# Patient Record
Sex: MALE | Race: WHITE | NOT HISPANIC OR LATINO | Employment: FULL TIME | URBAN - METROPOLITAN AREA
[De-identification: names, ages, dates, MRNs, and addresses within clinical notes are randomized per-mention and may not be internally consistent; named-entity substitution may affect disease eponyms.]

---

## 2020-09-01 ENCOUNTER — HOSPITAL ENCOUNTER (OUTPATIENT)
Dept: LAB | Facility: HOSPITAL | Age: 60
Discharge: HOME | End: 2020-09-01
Attending: NURSE PRACTITIONER

## 2020-09-01 ENCOUNTER — OFFICE VISIT CONVERTED (OUTPATIENT)
Dept: FAMILY MEDICINE CLINIC | Facility: CLINIC | Age: 60
End: 2020-09-01
Attending: NURSE PRACTITIONER

## 2020-09-01 ENCOUNTER — CONVERSION ENCOUNTER (OUTPATIENT)
Dept: FAMILY MEDICINE CLINIC | Facility: CLINIC | Age: 60
End: 2020-09-01

## 2020-09-01 LAB
ALBUMIN SERPL-MCNC: 4.4 G/DL (ref 3.5–5)
ALBUMIN/GLOB SERPL: 2 {RATIO} (ref 1.4–2.6)
ALP SERPL-CCNC: 72 U/L (ref 56–119)
ALT SERPL-CCNC: 18 U/L (ref 10–40)
ANION GAP SERPL CALC-SCNC: 20 MMOL/L (ref 8–19)
AST SERPL-CCNC: 20 U/L (ref 15–50)
BASOPHILS # BLD AUTO: 0.03 10*3/UL (ref 0–0.2)
BASOPHILS NFR BLD AUTO: 0.7 % (ref 0–3)
BILIRUB SERPL-MCNC: 0.4 MG/DL (ref 0.2–1.3)
BUN SERPL-MCNC: 24 MG/DL (ref 5–25)
BUN/CREAT SERPL: 24 {RATIO} (ref 6–20)
CALCIUM SERPL-MCNC: 9.9 MG/DL (ref 8.7–10.4)
CHLORIDE SERPL-SCNC: 108 MMOL/L (ref 99–111)
CHOLEST SERPL-MCNC: 175 MG/DL (ref 107–200)
CHOLEST/HDLC SERPL: 4.2 {RATIO} (ref 3–6)
CONV ABS IMM GRAN: 0.01 10*3/UL (ref 0–0.2)
CONV CO2: 20 MMOL/L (ref 22–32)
CONV IMMATURE GRAN: 0.2 % (ref 0–1.8)
CONV TOTAL PROTEIN: 6.6 G/DL (ref 6.3–8.2)
CREAT UR-MCNC: 1.02 MG/DL (ref 0.7–1.2)
DEPRECATED RDW RBC AUTO: 41.1 FL (ref 35.1–43.9)
EOSINOPHIL # BLD AUTO: 0.04 10*3/UL (ref 0–0.7)
EOSINOPHIL # BLD AUTO: 0.9 % (ref 0–7)
ERYTHROCYTE [DISTWIDTH] IN BLOOD BY AUTOMATED COUNT: 11.9 % (ref 11.6–14.4)
GFR SERPLBLD BASED ON 1.73 SQ M-ARVRAT: >60 ML/MIN/{1.73_M2}
GLOBULIN UR ELPH-MCNC: 2.2 G/DL (ref 2–3.5)
GLUCOSE SERPL-MCNC: 114 MG/DL (ref 70–99)
HCT VFR BLD AUTO: 45.6 % (ref 42–52)
HDLC SERPL-MCNC: 42 MG/DL (ref 40–60)
HGB BLD-MCNC: 14.9 G/DL (ref 14–18)
LDLC SERPL CALC-MCNC: 111 MG/DL (ref 70–100)
LYMPHOCYTES # BLD AUTO: 0.92 10*3/UL (ref 1–5)
LYMPHOCYTES NFR BLD AUTO: 20.3 % (ref 20–45)
MCH RBC QN AUTO: 30.8 PG (ref 27–31)
MCHC RBC AUTO-ENTMCNC: 32.7 G/DL (ref 33–37)
MCV RBC AUTO: 94.4 FL (ref 80–96)
MONOCYTES # BLD AUTO: 0.31 10*3/UL (ref 0.2–1.2)
MONOCYTES NFR BLD AUTO: 6.8 % (ref 3–10)
NEUTROPHILS # BLD AUTO: 3.22 10*3/UL (ref 2–8)
NEUTROPHILS NFR BLD AUTO: 71.1 % (ref 30–85)
NRBC CBCN: 0 % (ref 0–0.7)
OSMOLALITY SERPL CALC.SUM OF ELEC: 301 MOSM/KG (ref 273–304)
PLATELET # BLD AUTO: 191 10*3/UL (ref 130–400)
PMV BLD AUTO: 10.4 FL (ref 9.4–12.4)
POTASSIUM SERPL-SCNC: 4.9 MMOL/L (ref 3.5–5.3)
PSA SERPL-MCNC: 5.2 NG/ML (ref 0–4)
RBC # BLD AUTO: 4.83 10*6/UL (ref 4.7–6.1)
SODIUM SERPL-SCNC: 143 MMOL/L (ref 135–147)
TRIGL SERPL-MCNC: 112 MG/DL (ref 40–150)
TSH SERPL-ACNC: 0.91 M[IU]/L (ref 0.27–4.2)
VLDLC SERPL-MCNC: 22 MG/DL (ref 5–37)
WBC # BLD AUTO: 4.53 10*3/UL (ref 4.8–10.8)

## 2020-09-02 LAB
EST. AVERAGE GLUCOSE BLD GHB EST-MCNC: 111 MG/DL
HBA1C MFR BLD: 5.5 % (ref 3.5–5.7)

## 2020-09-23 ENCOUNTER — OFFICE VISIT CONVERTED (OUTPATIENT)
Dept: SURGERY | Facility: CLINIC | Age: 60
End: 2020-09-23
Attending: NURSE PRACTITIONER

## 2020-10-16 ENCOUNTER — HOSPITAL ENCOUNTER (OUTPATIENT)
Dept: LAB | Facility: HOSPITAL | Age: 60
Discharge: HOME | End: 2020-10-16
Attending: NURSE PRACTITIONER

## 2020-10-16 LAB — PSA SERPL-MCNC: 4.95 NG/ML (ref 0–4)

## 2020-12-01 ENCOUNTER — OFFICE VISIT CONVERTED (OUTPATIENT)
Dept: FAMILY MEDICINE CLINIC | Facility: CLINIC | Age: 60
End: 2020-12-01
Attending: NURSE PRACTITIONER

## 2020-12-23 ENCOUNTER — HOSPITAL ENCOUNTER (OUTPATIENT)
Dept: LAB | Facility: HOSPITAL | Age: 60
Discharge: HOME | End: 2020-12-23
Attending: NURSE PRACTITIONER

## 2020-12-23 LAB — PSA SERPL-MCNC: 5.11 NG/ML (ref 0–4)

## 2021-02-26 ENCOUNTER — HOSPITAL ENCOUNTER (OUTPATIENT)
Dept: OTHER | Facility: HOSPITAL | Age: 61
Discharge: HOME | End: 2021-02-26

## 2021-03-01 ENCOUNTER — HOSPITAL ENCOUNTER (OUTPATIENT)
Dept: LAB | Facility: HOSPITAL | Age: 61
Discharge: HOME | End: 2021-03-01
Attending: NURSE PRACTITIONER

## 2021-03-01 ENCOUNTER — OFFICE VISIT CONVERTED (OUTPATIENT)
Dept: FAMILY MEDICINE CLINIC | Facility: CLINIC | Age: 61
End: 2021-03-01
Attending: NURSE PRACTITIONER

## 2021-03-01 LAB
ALBUMIN SERPL-MCNC: 4.2 G/DL (ref 3.5–5)
ALBUMIN/GLOB SERPL: 1.7 {RATIO} (ref 1.4–2.6)
ALP SERPL-CCNC: 73 U/L (ref 56–119)
ALT SERPL-CCNC: 21 U/L (ref 10–40)
ANION GAP SERPL CALC-SCNC: 17 MMOL/L (ref 8–19)
AST SERPL-CCNC: 19 U/L (ref 15–50)
BASOPHILS # BLD AUTO: 0.04 10*3/UL (ref 0–0.2)
BASOPHILS NFR BLD AUTO: 1 % (ref 0–3)
BILIRUB SERPL-MCNC: 0.42 MG/DL (ref 0.2–1.3)
BUN SERPL-MCNC: 21 MG/DL (ref 5–25)
BUN/CREAT SERPL: 21 {RATIO} (ref 6–20)
CALCIUM SERPL-MCNC: 9 MG/DL (ref 8.7–10.4)
CHLORIDE SERPL-SCNC: 108 MMOL/L (ref 99–111)
CHOLEST SERPL-MCNC: 228 MG/DL (ref 107–200)
CHOLEST/HDLC SERPL: 5.2 {RATIO} (ref 3–6)
CONV ABS IMM GRAN: 0.01 10*3/UL (ref 0–0.2)
CONV CO2: 23 MMOL/L (ref 22–32)
CONV IMMATURE GRAN: 0.3 % (ref 0–1.8)
CONV TOTAL PROTEIN: 6.7 G/DL (ref 6.3–8.2)
CREAT UR-MCNC: 1.02 MG/DL (ref 0.7–1.2)
DEPRECATED RDW RBC AUTO: 41.6 FL (ref 35.1–43.9)
EOSINOPHIL # BLD AUTO: 0.05 10*3/UL (ref 0–0.7)
EOSINOPHIL # BLD AUTO: 1.3 % (ref 0–7)
ERYTHROCYTE [DISTWIDTH] IN BLOOD BY AUTOMATED COUNT: 12.4 % (ref 11.6–14.4)
GFR SERPLBLD BASED ON 1.73 SQ M-ARVRAT: >60 ML/MIN/{1.73_M2}
GLOBULIN UR ELPH-MCNC: 2.5 G/DL (ref 2–3.5)
GLUCOSE SERPL-MCNC: 112 MG/DL (ref 70–99)
HCT VFR BLD AUTO: 45.2 % (ref 42–52)
HDLC SERPL-MCNC: 44 MG/DL (ref 40–60)
HGB BLD-MCNC: 14.9 G/DL (ref 14–18)
LDLC SERPL CALC-MCNC: 167 MG/DL (ref 70–100)
LYMPHOCYTES # BLD AUTO: 1.04 10*3/UL (ref 1–5)
LYMPHOCYTES NFR BLD AUTO: 26.1 % (ref 20–45)
MCH RBC QN AUTO: 30.2 PG (ref 27–31)
MCHC RBC AUTO-ENTMCNC: 33 G/DL (ref 33–37)
MCV RBC AUTO: 91.7 FL (ref 80–96)
MONOCYTES # BLD AUTO: 0.28 10*3/UL (ref 0.2–1.2)
MONOCYTES NFR BLD AUTO: 7 % (ref 3–10)
NEUTROPHILS # BLD AUTO: 2.56 10*3/UL (ref 2–8)
NEUTROPHILS NFR BLD AUTO: 64.3 % (ref 30–85)
NRBC CBCN: 0 % (ref 0–0.7)
OSMOLALITY SERPL CALC.SUM OF ELEC: 302 MOSM/KG (ref 273–304)
PLATELET # BLD AUTO: 183 10*3/UL (ref 130–400)
PMV BLD AUTO: 10 FL (ref 9.4–12.4)
POTASSIUM SERPL-SCNC: 4.4 MMOL/L (ref 3.5–5.3)
RBC # BLD AUTO: 4.93 10*6/UL (ref 4.7–6.1)
SODIUM SERPL-SCNC: 144 MMOL/L (ref 135–147)
TRIGL SERPL-MCNC: 86 MG/DL (ref 40–150)
TSH SERPL-ACNC: 1.29 M[IU]/L (ref 0.27–4.2)
VLDLC SERPL-MCNC: 17 MG/DL (ref 5–37)
WBC # BLD AUTO: 3.98 10*3/UL (ref 4.8–10.8)

## 2021-03-08 ENCOUNTER — OFFICE VISIT CONVERTED (OUTPATIENT)
Dept: SURGERY | Facility: CLINIC | Age: 61
End: 2021-03-08
Attending: NURSE PRACTITIONER

## 2021-04-27 ENCOUNTER — HOSPITAL ENCOUNTER (OUTPATIENT)
Dept: LAB | Facility: HOSPITAL | Age: 61
Discharge: HOME | End: 2021-04-27
Attending: NURSE PRACTITIONER

## 2021-04-27 LAB — PSA SERPL-MCNC: 5.71 NG/ML (ref 0–4)

## 2021-05-10 NOTE — H&P
History and Physical      Patient Name: Davi Garcia   Patient ID: [id number]   Sex: Male   YOB: 1960    Primary Care Provider: Carmela WOODARD   Referring Provider: Carmela WOODARD    Visit Date: March 8, 2021    Provider: YAMILET Echeverria   Location: [facility]   Location Address: [address] [place] [address]  [id number]   Location Phone: [phone]          Chief Complaint  · Follow Up Imaging      History Of Present Illness  Davi Garcia is a 61 year old /White male who is here to follow up imaging.      Patient presents today for a follow-up visit to discussing results of MRI of the prostate.    MRI prostate reveals:  1. 4.6 x3.7 x3.9cm with total volume 33.38mL.  2. Heterogeneous signal intensity and enhancement in the mildly enlarged central gland is r/t BPH.  3. There is a hazy and streaky decreased T@ signal intensity in the bilateral peripheral zones without significant restricted diffusion and favored to be r/t to chronic prostatitis.   4. Thickening of the wall of the urinary bladder which is not well-distended and may be exaggerated by under distention, cystitis, or outlet obstruction is not excluded. Seminal vesicles are symmetric.         Past Medical History  Disease Name Date Onset Notes   High blood pressure --  --    High cholesterol --  --    Kidney stones --  --          Past Surgical History  Procedure Name Date Notes   Colonoscopy --  --          Medication List  Name Date Started Instructions   amlodipine-benazepril 5-20 mg oral capsule 03/01/2021 take 1 capsule by oral route once daily for 90 days   Cipro 250 mg oral tablet 03/08/2021 take 1 tablet by oral route 2 times a day for 21 days   Fish Oil 360-1,200 mg oral capsule,delayed release(DR/EC)  take 1 capsule by oral route daily for 90 days   Zetia 10 mg oral tablet 03/02/2021 take 1 tablet (10 mg) by oral route once daily for 90 days         Allergy List  Allergen Name Date  "Reaction Notes   STATINS-HMG-COA REDUCTASE INHIBITORS --  --  --        Allergies Reconciled  Family Medical History  Disease Name Relative/Age Notes   Renal Calculus Father/   Father         Social History  Finding Status Start/Stop Quantity Notes   Alcohol Current some day 0/0 --  drinks rarely; beer   Caffeine Current every day 0/0 --  drinks regularly; coffee; 1-2 times per day   Second hand smoke exposure Never 0/0 --  no   Tobacco Never 0/0 --  never a smoker         Review of Systems  · Constitutional  o Denies  o : chills, fever  · Eyes  o Denies  o : yellowish discoloration of eyes  · HENT  o Denies  o : difficulty swallowing  · Cardiovascular  o Denies  o : chest pain on exertion  · Respiratory  o Denies  o : shortness of breath  · Genitourinary  o Admits  o : nocturia  o Denies  o : urgency, frequency, dysuria, hematuria, oliguria, change in urine color, incontinence, urinary retention, difficulty voiding, urinary hesitancy, decreased stream, post-void dribbling, abnormal color of urine  · Integument  o Denies  o : rash  · Neurologic  o Denies  o : tingling or numbness  · Musculoskeletal  o Denies  o : joint pain  · Endocrine  o Denies  o : weight gain, weight loss      Vitals  Date Time BP Position Site L\R Cuff Size HR RR TEMP (F) WT  HT  BMI kg/m2 BSA m2 O2 Sat FR L/min FiO2 HC       03/08/2021 02:44 PM       18  252lbs 2oz 6'  1\" 33.26 2.43             Physical Examination  · Constitutional  o Appearance  o : well developed, well-nourished, patient in no apparent distress  · Head and Face  o Head  o :   § Inspection  § : atraumatic, normocephalic  o Face  o :   § Inspection  § : no facial lesions  · Eyes  o Conjunctivae  o : conjunctivae normal  o Sclerae  o : sclerae white  · Neck  o Inspection/Palpation  o : normal appearance, no masses or tenderness, trachea midline  · Respiratory  o Respiratory Effort  o : breathing unlabored  · Skin and Subcutaneous Tissue  o General Inspection  o : no lesions " present, no areas of discoloration, skin turgor normal, texture normal  · Neurologic  o Mental Status Examination  o :   § Orientation  § : grossly oriented to person, place and time  § Attention  § : attention normal, concentration abilities normal  § Fund of Knowledge  § : fund of knowledge within normal limits, patient aware of current events  o Gait and Station  o : normal gait, able to stand without difficulty  · Psychiatric  o Judgement and Insight  o : judgment and insight intact  o Mood and Affect  o : mood normal, affect appropriate              Assessment  · BPH (benign prostatic hyperplasia)     600.00/N40.0  · Prostatitis     601.9/N41.9    Problems Reconciled  Plan  · Orders  o PSA ultrasensitive DIAGNOSTIC [facility] ([id number]) - 600.00/N40.0, 601.9/N41.9 - 04/12/2021  · Medications  o Medications have been Reconciled  o Transition of Care or Provider Policy  · Instructions  o F/u in office with me in 5 weeks with repeat psa prior. Will need cysto with Dr. Cooper to be schedule at next visit.   o Electronically Identified Patient Education Materials Provided Electronically            Electronically Signed by: YAMILET Echeverria -Author on March 8, 2021 07:08:40 PM

## 2021-05-10 NOTE — H&P
"   History and Physical      Patient Name: Davi Garcia   Patient ID: [id number]   Sex: Male   YOB: 1960    Primary Care Provider: Carmela WOODARD   Referring Provider: Carmela WOODARD    Visit Date: September 23, 2020    Provider: YAMILET Echeverria   Location: [facility]   Location Address: [address] [place] [address]  [id number]   Location Phone: [phone]          Chief Complaint  · Elevated PSA  · \"My doctor sent me because my blood test is high\"      History Of Present Illness  The patient is a 60 year old /White male, who presents on referral from Carmela WOODARD, for an urological evaluation for an elevated PSA. The PSA was noted as elevated on 09/01/2020 and stated to be mildly elevated and at a level of 5.20 . There has not been a repeat PSA since the last elevated one on 09/01/2020   The patient also reports nocturia, 1-2 times a night. Additionally, he denies burning, chills, fever, frequency, hematuria, hesitancy, incomplete emptying, previous UTI's, and slowing of his stream.   The patient is currently not taking any Urology specific medications.   Petinent studies:  To date, no diagnostic studies have been performed. He has not had any recent care for this condition.      Patient denies any urinary frequency, urgency or dysuria.    Admits to nocturia 1-2 times/night.    Denies any penis or scrotum pain.    Denies slowing of urinary stream.  Denies any post void dribbling.    Denies any recent UTIs over the last year.    No family history of prostate cancer.    Previous psa's:  9/20: 5.20       Past Medical History  Disease Name Date Onset Notes   High blood pressure --  --    High cholesterol --  --    Kidney stones --  --          Past Surgical History  Procedure Name Date Notes   Colonoscopy --  --          Medication List  Name Date Started Instructions   amlodipine-benazepril 5-20 mg oral capsule 09/01/2020 take 1 capsule by oral route once " "daily for 90 days   Fish Oil 360-1,200 mg oral capsule,delayed release(DR/EC)  take 1 capsule by oral route daily for 90 days   pravastatin 20 mg oral tablet 09/01/2020 TAKE 1 TABLET BY MOUTH EVERYDAY AT BEDTIME for 90 days         Allergy List  Allergen Name Date Reaction Notes   NO KNOWN DRUG ALLERGIES --  --  --        Allergies Reconciled  Family Medical History  Disease Name Relative/Age Notes   Renal Calculus Father/   Father         Social History  Finding Status Start/Stop Quantity Notes   Alcohol Current some day 0/0 --  drinks rarely; beer   Caffeine Current every day 0/0 --  drinks regularly; coffee; 1-2 times per day   Second hand smoke exposure Never 0/0 --  no   Tobacco Never 0/0 --  never a smoker         Review of Systems  · Constitutional  o Denies  o : fever, chills  · HENT  o Denies  o : sore throat, nasal congestion, nasal discharge, sinus pain, headaches  · Cardiovascular  o Denies  o : chest pain, cardiac murmurs, irregular heart beats, dyspnea on exertion  · Respiratory  o Denies  o : shortness of breath, wheezing  · Gastrointestinal  o Denies  o : nausea, vomiting, change in abdominal girth, diarrhea, constipation, blood in stools  · Genitourinary  o Admits  o : nocturia  o Denies  o : urgency, frequency, dysuria, hematuria, pyuria, oliguria, change in urine color, incontinence, urinary retention, difficulty voiding, urinary hesitancy, decreased stream, post-void dribbling, scrotal pain, additional symptoms, except as noted in HPI  · Integument  o Denies  o : rash, itching, new skin lesions  · Neurologic  o Denies  o : tingling or numbness, incoordination, seizures  · Endocrine  o Denies  o : polyuria, polydipsia, cold intolerance, heat intolerance, weight gain, weight loss  · Psychiatric  o Denies  o : anxiety, depression      Vitals  Date Time BP Position Site L\R Cuff Size HR RR TEMP (F) WT  HT  BMI kg/m2 BSA m2 O2 Sat        09/23/2020 10:17 AM       12  249lbs 2oz 6'  1\" 32.87 2.41   "         Physical Examination  · Constitutional  o Appearance  o : Well nourished, well developed patient in no acute distress. Ambulating without difficulty.  · Neck  o Thyroid  o : Normal size without tenderness, nodules or masses  · Respiratory  o Respiratory Effort  o : Breathing is unlabored without accessory muscle use  o Auscultation of Lungs  o : Normal breath sounds  · Gastrointestinal  o Abdominal Examination  o : Scaphoid abdomen which is non-tender to palpation with normal tone and without rigidity or guarding. Normal bowel sounds. No masses present.  o Liver and spleen  o : No hepatomegaly present. Liver is non-tender to palpation and spleen is not palpable.  o Hernias  o : No abdominal wall hernias are present.  · Genitourinary  o Penis  o : Normal appearance without lesion, discharge or masses.  · Lymphatic  o Neck  o : No lymphadenopathy present  o Axilla  o : No lymphadenopathy present  o Groin  o : No lymphadenopathy present  · Skin and Subcutaneous Tissue  o General Inspection  o : No rashes, lesions or areas of discoloration present. Skin turgor is normal.  o General Palpation  o : No abnormalities, masses or tenderness on palpation.          Results  · In-Office Procedures  o Lab procedure  § Automated Dipstick Urinalysis (Surg Spec) WITHOUT Micro [facility] (19208)   § Color Ur: Yellow   § Clarity Ur: Clear   § Glucose Ur Ql Strip: Negative   § Bilirub Ur Ql Strip: Negative   § Ketones Ur Ql Strip: Negative   § Sp Gr Ur Qn: 1.030   § Hgb Ur Ql Strip: Small   § pH Ur-LsCnc: 5.0   § Prot Ur Ql Strip: Negative   § Urobilinogen Ur Strip-mCnc: 0.2 E.U./dL   § Nitrite Ur Ql Strip: Negative   § WBC Est Ur Ql Strip: Negative       Assessment  · Elevated PSA     790.93/R97.20  · Nocturia     788.43/R35.1    Problems Reconciled  Plan  · Orders  o PSA ultrasensitive DIAGNOSTIC [facility] ([id number]) - 790.93/R97.20, 788.43/R35.1 - 10/14/2020  · Medications  o Medications have been Reconciled  o Transition of Care or  Provider Policy  · Instructions  o DISCUSSION AND PLAN: I have discussed with the patient that there is no PSA level that can indicate that he has prostate cancer. Prostate biopsy is only way to confirm.   o The patient's PSA is elevated on initial exam. I have discussed the causes of an elevated PSA. I have made recommendations and I have the patient return in follow-up for a recheck of the PSA.  o Electronically Identified Patient Education Materials Provided Electronically            Electronically Signed by: YAMILET Echeverria -Author on September 23, 2020 11:40:52 AM

## 2021-05-10 NOTE — H&P
History and Physical      Patient Name: Davi Garcia   Patient ID: [id number]   Sex: Male   YOB: 1960    Primary Care Provider: Carmela WOODARD   Referring Provider: Carmela WOODARD    Visit Date: September 1, 2020    Provider: YAMILET Gregory   Location: [facility] [place]   Location Address: [address] [place] [address], [address]  [place], [place]  [id number]   Location Phone: [phone]          Chief Complaint  · Establilsh care  · med refills- HTN HLD      History Of Present Illness  Davi Garcia is a 60 year old /White male who presents for evaluation and treatment of:      Patient is here today to establish care as a patient here. Patient was previously seen at hospitals.     Patient is here for medication refills and lab work.    HTN: Amlodipine- Benazepril, pt states he is doing well on medications. He states he does check his b/p outside of office, but not regularly. He does report a lot of stress with work and the Covid pandemic.    HLD: Pravastatin, fish oil. He is doing well on the medications.    Patient is doing well with all medications and has no complaints.    He does have nocturia 1-2 times nightly, denies any dysuria. He is due for PSA check.    Colonoscopy: 10/14/2016- repeat in 5 years.       Past Medical History  Disease Name Date Onset Notes   High blood pressure --  --    High cholesterol --  --          Past Surgical History  Procedure Name Date Notes   Colonoscopy --  --          Medication List  Name Date Started Instructions   amlodipine-benazepril 5-20 mg oral capsule 09/01/2020 take 1 capsule by oral route once daily for 90 days   Fish Oil 360-1,200 mg oral capsule,delayed release(DR/EC)  take 1 capsule by oral route daily for 90 days   pravastatin 20 mg oral tablet 09/01/2020 TAKE 1 TABLET BY MOUTH EVERYDAY AT BEDTIME for 90 days         Allergy List  Allergen Name Date Reaction Notes   NO KNOWN DRUG ALLERGIES --  --  --        Allergies  "Reconciled  Family Medical History  Disease Name Relative/Age Notes   Renal Calculus Father/   Father         Social History  Finding Status Start/Stop Quantity Notes   Alcohol Current some day 0/0 --  drinks rarely; beer   Caffeine Current every day 0/0 --  drinks regularly; coffee; 1-2 times per day   Second hand smoke exposure Never 0/0 --  no   Tobacco Never 0/0 --  never a smoker         Review of Systems  · Constitutional  o Denies  o : fatigue  · Eyes  o Denies  o : blurred vision, changes in vision  · HENT  o Denies  o : headaches  · Cardiovascular  o Denies  o : chest pain, irregular heart beats, rapid heart rate, dyspnea on exertion  · Respiratory  o Denies  o : shortness of breath, wheezing, cough  · Gastrointestinal  o Denies  o : nausea, vomiting, diarrhea, constipation, abdominal pain, blood in stools, melena  · Genitourinary  o Admits  o : nocturia  o Denies  o : frequency, dysuria, hematuria  · Integument  o Denies  o : rash, new skin lesions  · Musculoskeletal  o Denies  o : joint pain, joint swelling, muscle pain  · Endocrine  o Denies  o : polyuria, polydipsia      Vitals  Date Time BP Position Site L\R Cuff Size HR RR TEMP (F) WT  HT  BMI kg/m2 BSA m2 O2 Sat HC       09/01/2020 09:03 /85 Sitting    64 - R  98 250lbs 16oz 6'  1\" 33.12 2.42 96 %    09/01/2020 09:30 /82 Sitting                     Physical Examination  · Constitutional  o Appearance  o : well developed, well-nourished, no acute distress  · Head and Face  o Head  o : normocephalic, atraumatic  · Neck  o Inspection/Palpation  o : normal appearance, no masses or tenderness, trachea midline  o Thyroid  o : gland size normal, nontender, no nodules or masses present on palpation  · Respiratory  o Respiratory Effort  o : breathing unlabored  o Inspection of Chest  o : chest rise symmetric bilaterally  o Auscultation of Lungs  o : clear to auscultation bilaterally throughout inspiration and " expiration  · Cardiovascular  o Heart  o :   § Auscultation of Heart  § : regular rate and rhythm, no murmurs, gallops or rubs  o Peripheral Vascular System  o :   § Extremities  § : no edema  · Lymphatic  o Neck  o : no cervical lymphadenopathy, no supraclavicular lymphadenopathy  · Psychiatric  o Mood and Affect  o : mood normal, affect appropriate              Assessment  · Screening for depression     V79.0/Z13.89  · Screening for prostate cancer     V76.44/Z12.5  · Essential hypertension     401.9/I10  cont. medication, advise to monitor b/p outside of office.   · Hyperlipidemia     272.4/E78.5  · Establishing care with new doctor, encounter for     V65.8/Z76.89  · Routine lab draw     V72.60/Z01.89    Problems Reconciled  Plan  · Orders  o ACO-18: Negative screen for clinical depression using a standardized tool () - V79.0/Z13.89 - 09/01/2020  o PSA Ultrasensitive, ANNUAL SCREENING [facility] (, [id number]) - V76.44/Z12.5 - 09/01/2020  o HTN/Lipid Panel (CMP, Lipid) [facility] ([id number], [id number]) - 401.9/I10 - 09/01/2020  o CBC with Auto Diff [facility] ([id number]) - 401.9/I10 - 09/01/2020  o TSH [facility] ([id number]) - 401.9/I10 - 09/01/2020  o ACO-39: Current medications updated and reviewed () - - 09/01/2020  o ACO-18: Negative screen for clinical depression using a standardized tool () - - 09/01/2020  o ACO-19: Colorectal cancer screening results documented and reviewed (3017F) - - 09/01/2020  · Medications  o amlodipine-benazepril 5-20 mg oral capsule   SIG: take 1 capsule by oral route once daily for 90 days   DISP: (90) capsule with 1 refills  Prescribed on 09/01/2020     o pravastatin 20 mg oral tablet   SIG: TAKE 1 TABLET BY MOUTH EVERYDAY AT BEDTIME for 90 days   DISP: (90) Tablet with 1 refills  Refilled on 09/01/2020     o Medications have been Reconciled  o Transition of Care or Provider Policy  · Instructions  o Depression Screen completed and scanned into the EMR under the designated folder within the patient's  documents.  o Today's PHQ-9 result is _0__  o Patient advised to monitor blood pressure (B/P) at home and journal readings. Patient informed that a B/P reading at home of more than 130/80 is considered hypertension. For readings greater sowr723/90 or higher patient is advised to follow up in the office with readings for management. Patient advised to limit sodium intake.  o Patient was educated and given low cholesterol diet information.  o Recommended exercise program to assist with cholesterol, weight loss and overall health improvement.  o Advised that cheeses and other sources of dairy fats, animal fats, fast food, and the extras (candy, pastries, pies, doughnuts and cookies) all contain LDL raising nutrients. Advised to increase fruits, vegetables, whole grains, and to monitor portion sizes.   o Patient is taking medications as prescribed and doing well.   o Patient was educated/instructed on their diagnosis, treatment and medications prior to discharge from the clinic today.  o Call the office with any concerns or questions.  o Discussed Covid-19 precautions including, but not limited to, social distancing, avoid touching your face, and hand washing.   o Electronically Identified Patient Education Materials Provided Electronically  · Disposition  o Follow Up in 3 months            Electronically Signed by: YAMILET Gregory -Author on September 1, 2020 09:44:26 AM

## 2021-05-13 NOTE — PROGRESS NOTES
Progress Note      Patient Name: Davi Garcia   Patient ID: [id number]   Sex: Male   YOB: 1960    Primary Care Provider: Carmela WOODARD   Referring Provider: Carmela WOODARD    Visit Date: December 1, 2020    Provider: YAMILET Gregory   Location: [facility]   Location Address: [address] [place] [address], [address]  [place], [place]  [id number]   Location Phone: [phone]          Chief Complaint  · Med follow up  · HTN Follow up      History Of Present Illness  Davi Garcia is a 60 year old /White male who presents for evaluation and treatment of:      Patient is here today to have a medication follow up as well as a hypertension follow up.     HTN-on amlodipine-benazapril 5/20 daily and doing well. Patient states that he has been taking his blood pressure at home. Patient denies chest pain or headaches. Patient is currently taking amlodipine-benzapril for his blood pressure and is tolerating the mediciation well. Pt has kept a b/p log that he has brought with him today. His b/p has been running 130s/70s. pt reports feeling well, denies any headache, chest pain or dizziness.     Hyperlipids-currently on fish oil and pravastatin nightly. He is c/o of leg cramps at night. Pravastatin is the only cholesterol medication pt has been on. He states he has noticed it more in the past few months since I was asking him about myalgias at the last OV.    elevated PSA-pt had elevated level on labs in 9/20. He was referred to urology, level was still elevated. He was treated with antibiotics that he just completed. He is waiting to hear from urology regarding repeat labwork.       Past Medical History  Disease Name Date Onset Notes   High blood pressure --  --    High cholesterol --  --    Kidney stones --  --          Past Surgical History  Procedure Name Date Notes   Colonoscopy --  --          Medication List  Name Date Started Instructions   amlodipine-benazepril 5-20 mg oral  "capsule 09/01/2020 take 1 capsule by oral route once daily for 90 days   Fish Oil 360-1,200 mg oral capsule,delayed release(DR/EC)  take 1 capsule by oral route daily for 90 days   pravastatin 20 mg oral tablet 09/01/2020 TAKE 1 TABLET BY MOUTH EVERYDAY AT BEDTIME for 90 days         Allergy List  Allergen Name Date Reaction Notes   NO KNOWN DRUG ALLERGIES --  --  --          Family Medical History  Disease Name Relative/Age Notes   Renal Calculus Father/   Father         Social History  Finding Status Start/Stop Quantity Notes   Alcohol Current some day 0/0 --  drinks rarely; beer   Caffeine Current every day 0/0 --  drinks regularly; coffee; 1-2 times per day   Second hand smoke exposure Never 0/0 --  no   Tobacco Never 0/0 --  never a smoker         Review of Systems  · Constitutional  o Denies  o : fatigue  · Eyes  o Denies  o : blurred vision, changes in vision  · HENT  o Denies  o : headaches  · Cardiovascular  o Denies  o : chest pain, irregular heart beats, rapid heart rate, dyspnea on exertion  · Respiratory  o Denies  o : shortness of breath, wheezing, cough  · Gastrointestinal  o Denies  o : nausea, vomiting, diarrhea, constipation, abdominal pain, blood in stools, melena  · Genitourinary  o Denies  o : frequency, dysuria, hematuria  · Integument  o Denies  o : rash, new skin lesions  · Musculoskeletal  o Denies  o : joint pain, joint swelling, muscle pain  · Endocrine  o Denies  o : polyuria, polydipsia      Vitals  Date Time BP Position Site L\R Cuff Size HR RR TEMP (F) WT  HT  BMI kg/m2 BSA m2 O2 Sat FR L/min FiO2 HC       12/01/2020 07:52 /88 Sitting    97 - R  98.1 251lbs 16oz 6'  1\" 33.25 2.43 97 %  21%          Physical Examination  · Constitutional  o Appearance  o : well developed, well-nourished, no acute distress  · Head and Face  o Head  o : normocephalic, atraumatic  · Neck  o Inspection/Palpation  o : normal appearance, no masses or tenderness, trachea midline  o Thyroid  o : gland " size normal, nontender, no nodules or masses present on palpation  · Respiratory  o Respiratory Effort  o : breathing unlabored  o Inspection of Chest  o : chest rise symmetric bilaterally  o Auscultation of Lungs  o : clear to auscultation bilaterally throughout inspiration and expiration  · Cardiovascular  o Heart  o :   § Auscultation of Heart  § : regular rate and rhythm, no murmurs, gallops or rubs  o Peripheral Vascular System  o :   § Extremities  § : no edema  · Lymphatic  o Neck  o : no cervical lymphadenopathy, no supraclavicular lymphadenopathy  · Psychiatric  o Mood and Affect  o : mood normal, affect appropriate          Assessment  · Essential hypertension     401.9/I10  · Hyperlipidemia     272.4/E78.5  · High blood pressure     401.9/I10  · High cholesterol     272.0/E78.0  · Myalgia     729.1/M79.10  hold pravastatin for now, cont. fish oil, pt to call in one month to let me know if improved.   · Elevated PSA     790.93/R97.20  cont. f/u with urology    Problems Reconciled  Plan  · Orders  o ACO-39: Current medications updated and reviewed (, 1159F) - - 12/01/2020  · Medications  o Bactrim -160 mg oral tablet   SIG: take 1 tablet by oral route every 12 hours for 21 days   DISP: (42) Tablet with 0 refills  Discontinued on 12/01/2020     · Instructions  o Patient advised to monitor blood pressure (B/P) at home and journal readings. Patient informed that a B/P reading at home of more than 130/80 is considered hypertension. For readings greater mjyx397/90 or higher patient is advised to follow up in the office with readings for management. Patient advised to limit sodium intake.  o Patient was educated and given low cholesterol diet information.  o Recommended exercise program to assist with cholesterol, weight loss and overall health improvement.  o Advised that cheeses and other sources of dairy fats, animal fats, fast food, and the extras (candy, pastries, pies, doughnuts and cookies) all  contain LDL raising nutrients. Advised to increase fruits, vegetables, whole grains, and to monitor portion sizes.   o Patient is taking medications as prescribed and doing well.   o Patient was educated/instructed on their diagnosis, treatment and medications prior to discharge from the clinic today.  o Call the office with any concerns or questions.  o Discussed Covid-19 precautions including, but not limited to, social distancing, avoid touching your face, and hand washing.   o Electronically Identified Patient Education Materials Provided Electronically  · Disposition  o Follow Up in 3 months            Electronically Signed by: YAMILET Gregory -Author on December 1, 2020 08:31:37 AM

## 2021-05-14 VITALS
HEART RATE: 64 BPM | SYSTOLIC BLOOD PRESSURE: 155 MMHG | OXYGEN SATURATION: 96 % | BODY MASS INDEX: 33.27 KG/M2 | DIASTOLIC BLOOD PRESSURE: 85 MMHG | TEMPERATURE: 98 F | HEIGHT: 73 IN | WEIGHT: 251 LBS

## 2021-05-14 VITALS
HEIGHT: 73 IN | BODY MASS INDEX: 33.58 KG/M2 | OXYGEN SATURATION: 97 % | HEART RATE: 62 BPM | SYSTOLIC BLOOD PRESSURE: 134 MMHG | WEIGHT: 253.37 LBS | DIASTOLIC BLOOD PRESSURE: 76 MMHG

## 2021-05-14 VITALS — RESPIRATION RATE: 18 BRPM | BODY MASS INDEX: 33.41 KG/M2 | HEIGHT: 73 IN | WEIGHT: 252.12 LBS

## 2021-05-14 VITALS
DIASTOLIC BLOOD PRESSURE: 88 MMHG | WEIGHT: 252 LBS | TEMPERATURE: 98.1 F | SYSTOLIC BLOOD PRESSURE: 141 MMHG | HEART RATE: 97 BPM | BODY MASS INDEX: 33.4 KG/M2 | HEIGHT: 73 IN | OXYGEN SATURATION: 97 %

## 2021-05-14 VITALS — HEIGHT: 73 IN | RESPIRATION RATE: 12 BRPM | WEIGHT: 249.12 LBS | BODY MASS INDEX: 33.02 KG/M2

## 2021-05-14 NOTE — PROGRESS NOTES
Progress Note      Patient Name: Davi Garcia   Patient ID: [id number]   Sex: Male   YOB: 1960    Primary Care Provider: Carmela WOODARD   Referring Provider: Carmela WOODARD    Visit Date: March 1, 2021    Provider: YAMILET Gregory   Location: [facility]   Location Address: [address] [place] [address], [address]  [place], [place]  [id number]   Location Phone: [phone]          Chief Complaint  · Med refill- HTN      History Of Present Illness  Davi Garcia is a 61 year old /White male who presents for evaluation and treatment of:      Patient is here today for a medication refill. Patient wanted us to be aware that he tested positive for COVID on 1/25/2021 and has recovered from this since then.       Patient is taking:  HTN: Amlodipine-Benzapril-- Patient is doing good with this medication and has no complaints of headache, chest pain, or LLE. Patient checks his BP at home and states that it normally runs around 130s/80s    HLD: Fish oil. Patient discharged the Pravastatin at last office visit and states that he was experiencing severe leg cramps. Patient states that he has been trying to watch his diet since he has been off the medication. The leg cramps he was experiencing has completely resolved.     Padmini Guaman is managing his care at [facility] for elevated PSA. Patient had an MRI on Friday. Patients last PSA was 5.11 on 12/23/2020 ordered by Padmini Guaman.    Pt  did have covid at the end of January and states he is feeling back to normal. He denies any residual symptoms.       Past Medical History  Disease Name Date Onset Notes   High blood pressure --  --    High cholesterol --  --    Kidney stones --  --          Past Surgical History  Procedure Name Date Notes   Colonoscopy --  --          Medication List  Name Date Started Instructions   amlodipine-benazepril 5-20 mg oral capsule 03/01/2021 take 1 capsule by oral route once daily for 90 days   Fish  "Oil 360-1,200 mg oral capsule,delayed release(DR/EC)  take 1 capsule by oral route daily for 90 days         Allergy List  Allergen Name Date Reaction Notes   STATINS-HMG-COA REDUCTASE INHIBITORS --  --  --        Allergies Reconciled  Family Medical History  Disease Name Relative/Age Notes   Renal Calculus Father/   Father         Social History  Finding Status Start/Stop Quantity Notes   Alcohol Current some day 0/0 --  drinks rarely; beer   Caffeine Current every day 0/0 --  drinks regularly; coffee; 1-2 times per day   Second hand smoke exposure Never 0/0 --  no   Tobacco Never 0/0 --  never a smoker         Review of Systems  · Constitutional  o Denies  o : fatigue  · Eyes  o Denies  o : blurred vision, changes in vision  · HENT  o Denies  o : headaches  · Cardiovascular  o Denies  o : chest pain, irregular heart beats, rapid heart rate, dyspnea on exertion  · Respiratory  o Denies  o : shortness of breath, wheezing, cough  · Gastrointestinal  o Denies  o : nausea, vomiting, diarrhea, constipation, abdominal pain, blood in stools, melena  · Genitourinary  o Denies  o : frequency, dysuria, hematuria  · Integument  o Denies  o : rash, new skin lesions  · Musculoskeletal  o Denies  o : joint pain, joint swelling, muscle pain  · Endocrine  o Denies  o : polyuria, polydipsia      Vitals  Date Time BP Position Site L\R Cuff Size HR RR TEMP (F) WT  HT  BMI kg/m2 BSA m2 O2 Sat FR L/min FiO2 HC       03/01/2021 07:54 /76 Sitting    62 - R   253lbs 6oz 6'  1\" 33.43 2.43 97 %  21%          Physical Examination  · Constitutional  o Appearance  o : well developed, well-nourished, no acute distress  · Head and Face  o Head  o : normocephalic, atraumatic  · Neck  o Inspection/Palpation  o : normal appearance, no masses or tenderness, trachea midline  o Thyroid  o : gland size normal, nontender, no nodules or masses present on palpation  · Respiratory  o Respiratory Effort  o : breathing unlabored  o Inspection of " Chest  o : chest rise symmetric bilaterally  o Auscultation of Lungs  o : clear to auscultation bilaterally throughout inspiration and expiration  · Cardiovascular  o Heart  o :   § Auscultation of Heart  § : regular rate and rhythm, no murmurs, gallops or rubs  o Peripheral Vascular System  o :   § Extremities  § : no edema  · Lymphatic  o Neck  o : no cervical lymphadenopathy, no supraclavicular lymphadenopathy  · Psychiatric  o Mood and Affect  o : mood normal, affect appropriate          Assessment  · Statin intolerance     995.27/Z78.9  · Essential hypertension     401.9/I10  · Hyperlipidemia     272.4/E78.5  · High blood pressure     401.9/I10  · High cholesterol     272.0/E78.0  · Elevated PSA     790.93/R97.20  cont f/u with urology    Problems Reconciled  Plan  · Orders  o ACO-42: Not currently on a statin or hasn't received an order for a statin due to documented medical reason () - 995.27/Z78.9 - 03/01/2021  o Physical, Primary Care Panel (CBC, CMP, Lipid, TSH) [facility] (25987, 44331, 64887, 16061) - 401.9/I10, 272.0/E78.0 - 03/01/2021  o ACO-14: Influenza immunization administered or previously received [facility] () - - 03/01/2021  o ACO-39: Current medications updated and reviewed (1159F, ) - - 03/01/2021  · Medications  o amlodipine-benazepril 5-20 mg oral capsule   SIG: take 1 capsule by oral route once daily for 90 days   DISP: (90) Capsule with 1 refills  Refilled on 03/01/2021     o pravastatin 20 mg oral tablet   SIG: TAKE 1 TABLET BY MOUTH EVERYDAY AT BEDTIME for 90 days   DISP: (90) Tablet with 1 refills  Discontinued on 03/01/2021     o Medications have been Reconciled  o Transition of Care or Provider Policy  · Instructions  o Patient has reported an intolerance to HmgCoA Inhibitors (statins).  o Patient advised to monitor blood pressure (B/P) at home and journal readings. Patient informed that a B/P reading at home of more than 130/80 is considered hypertension. For readings greater  npoo513/90 or higher patient is advised to follow up in the office with readings for management. Patient advised to limit sodium intake.  o Patient was educated and given low cholesterol diet information.  o Advised that cheeses and other sources of dairy fats, animal fats, fast food, and the extras (candy, pastries, pies, doughnuts and cookies) all contain LDL raising nutrients. Advised to increase fruits, vegetables, whole grains, and to monitor portion sizes.   o Patient is taking medications as prescribed and doing well.   o Patient was educated/instructed on their diagnosis, treatment and medications prior to discharge from the clinic today.  o Call the office with any concerns or questions.  o Electronically Identified Patient Education Materials Provided Electronically  · Disposition  o Follow Up in 6 months            Electronically Signed by: YAMILET Gregory -Author on March 1, 2021 08:26:04 AM

## 2021-06-28 ENCOUNTER — OFFICE VISIT (OUTPATIENT)
Dept: UROLOGY | Facility: CLINIC | Age: 61
End: 2021-06-28

## 2021-06-28 VITALS — BODY MASS INDEX: 33.16 KG/M2 | HEIGHT: 73 IN | WEIGHT: 250.2 LBS

## 2021-06-28 DIAGNOSIS — N40.0 BPH WITH ELEVATED PSA: ICD-10-CM

## 2021-06-28 DIAGNOSIS — R97.20 BPH WITH ELEVATED PSA: ICD-10-CM

## 2021-06-28 DIAGNOSIS — R97.20 ELEVATED PROSTATE SPECIFIC ANTIGEN (PSA): Primary | ICD-10-CM

## 2021-06-28 PROCEDURE — 99213 OFFICE O/P EST LOW 20 MIN: CPT | Performed by: NURSE PRACTITIONER

## 2021-06-28 RX ORDER — AMLODIPINE BESYLATE AND BENAZEPRIL HYDROCHLORIDE 5; 20 MG/1; MG/1
1 CAPSULE ORAL DAILY
COMMUNITY
Start: 2021-05-27 | End: 2021-08-30

## 2021-06-28 RX ORDER — TAMSULOSIN HYDROCHLORIDE 0.4 MG/1
1 CAPSULE ORAL DAILY
Qty: 30 CAPSULE | Refills: 1 | Status: SHIPPED | OUTPATIENT
Start: 2021-06-28 | End: 2021-07-28

## 2021-06-28 RX ORDER — EZETIMIBE 10 MG/1
TABLET ORAL
COMMUNITY
Start: 2021-03-02 | End: 2021-08-30

## 2021-06-28 RX ORDER — OMEGA-3/DHA/EPA/FISH OIL 300-1000MG
CAPSULE ORAL DAILY
COMMUNITY
End: 2022-03-01 | Stop reason: ALTCHOICE

## 2021-06-28 RX ORDER — AMOXICILLIN 500 MG
1200 CAPSULE ORAL NIGHTLY
COMMUNITY

## 2021-06-28 NOTE — PROGRESS NOTES
Chief Complaint  Follow-up (5 wk PSA)    Subjective          Davi Garcia presents to [facility]  Presents today for follow-up visit after having his PSA repeated.      Patient admits to occasional nocturia.      Denies any urinary frequency urgency or dysuria.    Denies any gross hematuria.    Denies any penis or scrotum pain.  Denies any ED issues.    Previous psa's:  : 5.71  12: 5.11  10/20: 4.95  : 5.20    RADIOLOGY REPORT     [facility]ACILITY:  [facility]   UNIT/AGE/GENDER: HAZEL  OP      AGE:61 Y          SEX:M   PATIENT NAME/:  DAVI GARCIA    1960   UNIT NUMBER:  [id number]   ACCOUNT NUMBER:  [id number]   ACCESSION NUMBER:  [id number]     MRI of the prostate with and without contrast dated 2021.     INDICATION: Elevated PSA. No previous biopsy.     TECHNIQUE: Multi parametric prostate protocol with high-resolution T1 and T2 weighted sequences, precontrast, diffusion weighted imaging and multiphase dynamic gadolinium enhanced gradient echo imaging during contrast administration. Post processing   includes construction of 3-D prostate volume and 3-D regions of interest for potential biopsy when applicable, performed on a separate workstation by the radiologist.     COMPARISON: None     FINDINGS: Motion artifact degrades image quality.     Prostate gland measures 4.6 x 3.7 x 3.9 cm for a total prostate volume of 33.38 mL.     Heterogeneous signal intensity and enhancement in the mildly enlarged central gland is related to BPH.     There is hazy and streaky decreased T2 signal intensity in the bilateral peripheral zones without significant restricted diffusion and favored to be related to chronic prostatitis.     Allowing for motion, there is no suspicious T2 localizing lesion to suggest significant prostate malignancy.     Thickening of the wall of the urinary bladder which is not well-distended and may be exaggerated by under distention.  "Cystitis or outlet obstruction is not excluded. Seminal vesicles are symmetric.     Pelvic bowel loops are unremarkable. No ascites or significant lymphadenopathy. There are degenerative changes with no suspicious osseous lesion. Mild fluid and enhancement surrounding the right hip may be synovitis or bursitis.     IMPRESSION:   1. No suspicious T2 localizing lesion to suggest significant prostate malignancy.   2. BPH.   3. Chronic prostatitis.     Dictated by: Cheyenne Santana M.D.     Images and Report reviewed and interpreted by: Cheyenne Santana M.D.     <PS><Electronically signed by: Cheyenne Santana M.D.>   03/01/2021 0810                     Objective   Vital Signs:   Ht 185.4 cm (73\")   Wt 113 kg (250 lb 3.2 oz)   BMI 33.01 kg/m²     Physical Exam  Constitutional:       Appearance: Normal appearance.   Cardiovascular:      Rate and Rhythm: Regular rhythm.   Pulmonary:      Effort: Pulmonary effort is normal.   Skin:     General: Skin is warm and dry.   Neurological:      General: No focal deficit present.      Mental Status: He is alert and oriented to person, place, and time.   Psychiatric:         Mood and Affect: Mood normal.         Behavior: Behavior normal.        Result Review :                 Assessment and Plan    Diagnoses and all orders for this visit:    1. Elevated prostate specific antigen (PSA) (Primary)  -     tamsulosin (FLOMAX) 0.4 MG capsule 24 hr capsule; Take 1 capsule by mouth Daily for 30 days.  Dispense: 30 capsule; Refill: 1  -     PSA Diagnostic; Future    2. BPH with elevated PSA  -     tamsulosin (FLOMAX) 0.4 MG capsule 24 hr capsule; Take 1 capsule by mouth Daily for 30 days.  Dispense: 30 capsule; Refill: 1  -     PSA Diagnostic; Future    Start Flomax daily.  Return for Cystoscopy with Dr. Cooper.          Follow Up   Return for cystoscopy with Dr. Cooper on 8/13/21 here in our office.  Patient was given instructions and counseling regarding his condition or for health " maintenance advice. Please see specific information pulled into the AVS if appropriate.

## 2021-08-30 RX ORDER — EZETIMIBE 10 MG/1
TABLET ORAL
Qty: 90 TABLET | Refills: 1 | Status: SHIPPED | OUTPATIENT
Start: 2021-08-30 | End: 2021-09-01 | Stop reason: SINTOL

## 2021-08-30 RX ORDER — AMLODIPINE BESYLATE AND BENAZEPRIL HYDROCHLORIDE 5; 20 MG/1; MG/1
CAPSULE ORAL
Qty: 90 CAPSULE | Refills: 1 | Status: SHIPPED | OUTPATIENT
Start: 2021-08-30 | End: 2021-09-01 | Stop reason: SDUPTHER

## 2021-08-31 ENCOUNTER — LAB (OUTPATIENT)
Dept: LAB | Facility: HOSPITAL | Age: 61
End: 2021-08-31

## 2021-08-31 DIAGNOSIS — R97.20 BPH WITH ELEVATED PSA: ICD-10-CM

## 2021-08-31 DIAGNOSIS — R97.20 ELEVATED PROSTATE SPECIFIC ANTIGEN (PSA): ICD-10-CM

## 2021-08-31 DIAGNOSIS — N40.0 BPH WITH ELEVATED PSA: ICD-10-CM

## 2021-08-31 LAB — PSA SERPL-MCNC: 4.94 NG/ML (ref 0–4)

## 2021-08-31 PROCEDURE — 84153 ASSAY OF PSA TOTAL: CPT

## 2021-08-31 PROCEDURE — 36415 COLL VENOUS BLD VENIPUNCTURE: CPT

## 2021-09-01 ENCOUNTER — LAB (OUTPATIENT)
Dept: LAB | Facility: HOSPITAL | Age: 61
End: 2021-09-01

## 2021-09-01 ENCOUNTER — OFFICE VISIT (OUTPATIENT)
Dept: FAMILY MEDICINE CLINIC | Facility: CLINIC | Age: 61
End: 2021-09-01

## 2021-09-01 VITALS
BODY MASS INDEX: 33.53 KG/M2 | HEART RATE: 58 BPM | WEIGHT: 253 LBS | SYSTOLIC BLOOD PRESSURE: 134 MMHG | DIASTOLIC BLOOD PRESSURE: 81 MMHG | HEIGHT: 73 IN | OXYGEN SATURATION: 98 %

## 2021-09-01 DIAGNOSIS — I10 ESSENTIAL HYPERTENSION: Primary | ICD-10-CM

## 2021-09-01 DIAGNOSIS — Z13.29 THYROID DISORDER SCREENING: ICD-10-CM

## 2021-09-01 DIAGNOSIS — G89.29 CHRONIC PAIN OF LEFT KNEE: ICD-10-CM

## 2021-09-01 DIAGNOSIS — G89.29 CHRONIC PAIN OF RIGHT KNEE: ICD-10-CM

## 2021-09-01 DIAGNOSIS — M25.561 CHRONIC PAIN OF RIGHT KNEE: ICD-10-CM

## 2021-09-01 DIAGNOSIS — E78.5 HYPERLIPIDEMIA, UNSPECIFIED HYPERLIPIDEMIA TYPE: ICD-10-CM

## 2021-09-01 DIAGNOSIS — M25.562 CHRONIC PAIN OF LEFT KNEE: ICD-10-CM

## 2021-09-01 DIAGNOSIS — R97.20 ELEVATED PSA: ICD-10-CM

## 2021-09-01 DIAGNOSIS — I10 ESSENTIAL HYPERTENSION: ICD-10-CM

## 2021-09-01 LAB
ALBUMIN SERPL-MCNC: 4.2 G/DL (ref 3.5–5.2)
ALBUMIN/GLOB SERPL: 1.5 G/DL
ALP SERPL-CCNC: 72 U/L (ref 39–117)
ALT SERPL W P-5'-P-CCNC: 20 U/L (ref 1–41)
ANION GAP SERPL CALCULATED.3IONS-SCNC: 9.6 MMOL/L (ref 5–15)
AST SERPL-CCNC: 19 U/L (ref 1–40)
BASOPHILS # BLD AUTO: 0.04 10*3/MM3 (ref 0–0.2)
BASOPHILS NFR BLD AUTO: 1 % (ref 0–1.5)
BILIRUB SERPL-MCNC: 0.4 MG/DL (ref 0–1.2)
BUN SERPL-MCNC: 23 MG/DL (ref 8–23)
BUN/CREAT SERPL: 19.2 (ref 7–25)
CALCIUM SPEC-SCNC: 9.4 MG/DL (ref 8.6–10.5)
CHLORIDE SERPL-SCNC: 106 MMOL/L (ref 98–107)
CHOLEST SERPL-MCNC: 243 MG/DL (ref 0–200)
CO2 SERPL-SCNC: 25.4 MMOL/L (ref 22–29)
CREAT SERPL-MCNC: 1.2 MG/DL (ref 0.76–1.27)
DEPRECATED RDW RBC AUTO: 39.6 FL (ref 37–54)
EOSINOPHIL # BLD AUTO: 0.05 10*3/MM3 (ref 0–0.4)
EOSINOPHIL NFR BLD AUTO: 1.2 % (ref 0.3–6.2)
ERYTHROCYTE [DISTWIDTH] IN BLOOD BY AUTOMATED COUNT: 12.3 % (ref 12.3–15.4)
GFR SERPL CREATININE-BSD FRML MDRD: 62 ML/MIN/1.73
GLOBULIN UR ELPH-MCNC: 2.8 GM/DL
GLUCOSE SERPL-MCNC: 105 MG/DL (ref 65–99)
HCT VFR BLD AUTO: 44.9 % (ref 37.5–51)
HDLC SERPL-MCNC: 46 MG/DL (ref 40–60)
HGB BLD-MCNC: 15.4 G/DL (ref 13–17.7)
IMM GRANULOCYTES # BLD AUTO: 0.01 10*3/MM3 (ref 0–0.05)
IMM GRANULOCYTES NFR BLD AUTO: 0.2 % (ref 0–0.5)
LDLC SERPL CALC-MCNC: 178 MG/DL (ref 0–100)
LDLC/HDLC SERPL: 3.83 {RATIO}
LYMPHOCYTES # BLD AUTO: 0.98 10*3/MM3 (ref 0.7–3.1)
LYMPHOCYTES NFR BLD AUTO: 24.2 % (ref 19.6–45.3)
MCH RBC QN AUTO: 30.4 PG (ref 26.6–33)
MCHC RBC AUTO-ENTMCNC: 34.3 G/DL (ref 31.5–35.7)
MCV RBC AUTO: 88.6 FL (ref 79–97)
MONOCYTES # BLD AUTO: 0.4 10*3/MM3 (ref 0.1–0.9)
MONOCYTES NFR BLD AUTO: 9.9 % (ref 5–12)
NEUTROPHILS NFR BLD AUTO: 2.57 10*3/MM3 (ref 1.7–7)
NEUTROPHILS NFR BLD AUTO: 63.5 % (ref 42.7–76)
NRBC BLD AUTO-RTO: 0 /100 WBC (ref 0–0.2)
PLATELET # BLD AUTO: 185 10*3/MM3 (ref 140–450)
PMV BLD AUTO: 9.8 FL (ref 6–12)
POTASSIUM SERPL-SCNC: 4.4 MMOL/L (ref 3.5–5.2)
PROT SERPL-MCNC: 7 G/DL (ref 6–8.5)
RBC # BLD AUTO: 5.07 10*6/MM3 (ref 4.14–5.8)
SODIUM SERPL-SCNC: 141 MMOL/L (ref 136–145)
TRIGL SERPL-MCNC: 105 MG/DL (ref 0–150)
TSH SERPL DL<=0.05 MIU/L-ACNC: 1.21 UIU/ML (ref 0.27–4.2)
VLDLC SERPL-MCNC: 19 MG/DL (ref 5–40)
WBC # BLD AUTO: 4.05 10*3/MM3 (ref 3.4–10.8)

## 2021-09-01 PROCEDURE — 80061 LIPID PANEL: CPT

## 2021-09-01 PROCEDURE — 84443 ASSAY THYROID STIM HORMONE: CPT

## 2021-09-01 PROCEDURE — 80053 COMPREHEN METABOLIC PANEL: CPT

## 2021-09-01 PROCEDURE — 85025 COMPLETE CBC W/AUTO DIFF WBC: CPT

## 2021-09-01 PROCEDURE — 99214 OFFICE O/P EST MOD 30 MIN: CPT | Performed by: NURSE PRACTITIONER

## 2021-09-01 PROCEDURE — 36415 COLL VENOUS BLD VENIPUNCTURE: CPT

## 2021-09-01 RX ORDER — AMLODIPINE BESYLATE AND BENAZEPRIL HYDROCHLORIDE 5; 20 MG/1; MG/1
1 CAPSULE ORAL DAILY
COMMUNITY
End: 2021-09-01 | Stop reason: SDUPTHER

## 2021-09-01 RX ORDER — AMLODIPINE BESYLATE AND BENAZEPRIL HYDROCHLORIDE 5; 20 MG/1; MG/1
1 CAPSULE ORAL DAILY
Qty: 90 CAPSULE | Refills: 1 | Status: SHIPPED | OUTPATIENT
Start: 2021-09-01 | End: 2022-03-01 | Stop reason: SDUPTHER

## 2021-09-01 NOTE — PROGRESS NOTES
Chief Complaint  Hypertension (Patient is currently on Amlodipine-Benzepril and is doing well with this. States that he does check his blood pressure at home and it is normally under 140 and in the 70-80s diastolic) and Hyperlipidemia (Zetia was prescribed at the last visit and has had to since self discharge due to muscle cramps, around the begining of august )    Subjective          Davi Garcia presents to [facility]  History of Present Illness  Patient is complaining of bilateral knee pain.  The right knee actually has buckled on him once in the last 3 months.  He is unsure if it is related but he is also having some right low back pain.  He denies any swelling of the knee.  He states he is able to do his regular activities to include biking without difficulty.  He is inquiring about possible massage therapy to help.    Patient is also complaining of left knee pain more in the medial area.  He states when he is walking around it is aggravated.  It is always in that same area.  He denies any swelling of the knee.    Patient states he rarely takes ibuprofen only as needed but when he does take it, it does help with the pain.    Elevated PSA-patient is currently being worked up per urology for a chronically elevated PSA level.    Past Medical History:   Diagnosis Date   • High blood pressure    • High cholesterol    • Kidney stones     HX of         Allergies   Allergen Reactions   • Statins Other (See Comments)   • Zetia [Ezetimibe] Myalgia          Past Surgical History:   Procedure Laterality Date   • COLONOSCOPY            Social History     Tobacco Use   • Smoking status: Never Smoker   • Smokeless tobacco: Never Used   Substance Use Topics   • Alcohol use: Yes     Comment: Current some day  drinks rarely; beer         Family History   Problem Relation Age of Onset   • Urolithiasis Father           Current Outpatient Medications on File Prior to Visit   Medication Sig   • fish  "oil-omega-3 fatty acids 1000 MG capsule Take  by mouth Daily.   • Omega-3 Fatty Acids (fish oil) 1200 MG capsule capsule Fish Oil 360-1,200 mg oral capsule,delayed release(DR/EC) take 1 capsule by oral route daily for 90 days   Active   • [DISCONTINUED] amLODIPine-benazepril (LOTREL 5-20) 5-20 MG per capsule TAKE 1 CAPSULE BY MOUTH EVERY DAY   • [DISCONTINUED] amLODIPine-benazepril (LOTREL 5-20) 5-20 MG per capsule Take 1 capsule by mouth Daily.   • [DISCONTINUED] ezetimibe (ZETIA) 10 MG tablet TAKE 1 TABLET BY MOUTH EVERY DAY     No current facility-administered medications on file prior to visit.         Immunization History   Administered Date(s) Administered   • COVID-19 (PFIZER) 04/05/2021, 04/27/2021         /81   Pulse 58   Ht 185.4 cm (73\")   Wt 115 kg (253 lb)   SpO2 98%   BMI 33.38 kg/m²             Physical Exam  Vitals reviewed.   Constitutional:       Appearance: Normal appearance. He is well-developed.   HENT:      Head: Normocephalic and atraumatic.      Right Ear: External ear normal.      Left Ear: External ear normal.      Mouth/Throat:      Pharynx: No oropharyngeal exudate.   Eyes:      Conjunctiva/sclera: Conjunctivae normal.      Pupils: Pupils are equal, round, and reactive to light.   Cardiovascular:      Rate and Rhythm: Normal rate and regular rhythm.      Heart sounds: No murmur heard.   No friction rub. No gallop.    Pulmonary:      Effort: Pulmonary effort is normal.      Breath sounds: Normal breath sounds. No wheezing or rhonchi.   Musculoskeletal:      Lumbar back: Tenderness present.        Back:       Right hip: Normal.      Left hip: Normal.      Right knee: Normal.      Left knee: Normal.   Skin:     General: Skin is warm and dry.   Neurological:      Mental Status: He is alert and oriented to person, place, and time.      Cranial Nerves: No cranial nerve deficit.   Psychiatric:         Mood and Affect: Mood and affect normal.         Behavior: Behavior normal.         " Thought Content: Thought content normal.         Judgment: Judgment normal.             Result Review :     The following data was reviewed by: YAMILET Page on 09/01/2021:    CMP    CMP 3/1/21   Glucose 112 (A)   BUN 21   Creatinine 1.02   Sodium 144   Potassium 4.4   Chloride 108   Calcium 9.0   Albumin 4.2   Total Bilirubin 0.42   Alkaline Phosphatase 73   AST (SGOT) 19   ALT (SGPT) 21   (A) Abnormal value            CBC w/diff    CBC w/Diff 3/1/21   WBC 3.98 (A)   RBC 4.93   Hemoglobin 14.9   Hematocrit 45.2   MCV 91.7   MCH 30.2   MCHC 33.0   RDW 12.4   Platelets 183   Neutrophil Rel % 64.3   Lymphocyte Rel % 26.1   Monocyte Rel % 7.0   Eosinophil Rel % 1.3   Basophil Rel % 1.0   (A) Abnormal value            Lipid Panel    Lipid Panel 3/1/21   Total Cholesterol 228 (A)   Triglycerides 86   HDL Cholesterol 44   VLDL Cholesterol 17   LDL Cholesterol  167 (A)   (A) Abnormal value       Comments are available for some flowsheets but are not being displayed.           TSH    TSH 3/1/21   TSH 1.290           PSA    PSA 12/23/20 4/27/21 8/31/21   PSA 5.11 (A) 5.71 (A) 4.940 (A)   (A) Abnormal value                            Assessment and Plan {CC Problem List  Visit Diagnosis  ROS  Review (Popup)  fav.or.it Maintenance  Quality  BestPractice  Medications  SmartSets  SnapShot Encounters  Media :23}     Diagnoses and all orders for this visit:    1. Essential hypertension (Primary)  Assessment & Plan:  Hypertension is improving with treatment.  Continue current treatment regimen.  Regular aerobic exercise.  Blood pressure will be reassessed at the next regular appointment.    Orders:  -     amLODIPine-benazepril (LOTREL 5-20) 5-20 MG per capsule; Take 1 capsule by mouth Daily.  Dispense: 90 capsule; Refill: 1  -     Comprehensive metabolic panel; Future  -     CBC w AUTO Differential; Future    2. Hyperlipidemia, unspecified hyperlipidemia type  Comments:  Patient to continue fish oil will reassess  labs   Orders:  -     Lipid panel; Future    3. Thyroid disorder screening  -     TSH; Future    4. Chronic pain of right knee  Comments:  OTC NSAID use discussed, massage and foam rolling and stretching encouraged.  Patient declines any further work-up    5. Chronic pain of left knee  Comments:  OTC NSAID use discussed, massage and foam rolling and stretching encouraged.  Patient declines any further work-up    6. Elevated PSA  Comments:  Continue follow-up with urology            Follow Up     Return in about 6 months (around 3/1/2022).    Patient was given instructions and counseling regarding his condition or for health maintenance advice. Please see specific information pulled into the AVS if appropriate.

## 2021-09-02 ENCOUNTER — TELEPHONE (OUTPATIENT)
Dept: FAMILY MEDICINE CLINIC | Facility: CLINIC | Age: 61
End: 2021-09-02

## 2021-09-02 DIAGNOSIS — R73.09 ELEVATED GLUCOSE: Primary | ICD-10-CM

## 2021-09-02 NOTE — TELEPHONE ENCOUNTER
----- Message from YAMILET Page sent at 9/1/2021  9:12 PM EDT -----  Elevated glucose, add hemoglobin A1C, cholesterol worse, must work harder on diet and exercise, will recheck in 6 months. Cont fish oil

## 2021-09-08 ENCOUNTER — LAB (OUTPATIENT)
Dept: LAB | Facility: HOSPITAL | Age: 61
End: 2021-09-08

## 2021-09-08 DIAGNOSIS — R73.09 ELEVATED GLUCOSE: ICD-10-CM

## 2021-09-08 LAB — HBA1C MFR BLD: 5.55 % (ref 4.8–5.6)

## 2021-09-08 PROCEDURE — 83036 HEMOGLOBIN GLYCOSYLATED A1C: CPT

## 2021-09-08 PROCEDURE — 36415 COLL VENOUS BLD VENIPUNCTURE: CPT

## 2021-09-09 ENCOUNTER — OFFICE VISIT (OUTPATIENT)
Dept: UROLOGY | Facility: CLINIC | Age: 61
End: 2021-09-09

## 2021-09-09 VITALS
DIASTOLIC BLOOD PRESSURE: 82 MMHG | BODY MASS INDEX: 33.48 KG/M2 | WEIGHT: 252.6 LBS | HEIGHT: 73 IN | SYSTOLIC BLOOD PRESSURE: 159 MMHG

## 2021-09-09 DIAGNOSIS — N32.89 BLADDER WALL THICKENING: Primary | ICD-10-CM

## 2021-09-09 LAB
BILIRUB BLD-MCNC: NEGATIVE MG/DL
CLARITY, POC: CLEAR
COLOR UR: YELLOW
GLUCOSE UR STRIP-MCNC: NEGATIVE MG/DL
KETONES UR QL: NEGATIVE
LEUKOCYTE EST, POC: NEGATIVE
NITRITE UR-MCNC: NEGATIVE MG/ML
PH UR: 6 [PH] (ref 5–8)
PROT UR STRIP-MCNC: NEGATIVE MG/DL
RBC # UR STRIP: ABNORMAL /UL
SP GR UR: 1.02 (ref 1–1.03)
UROBILINOGEN UR QL: NORMAL

## 2021-09-09 PROCEDURE — 52000 CYSTOURETHROSCOPY: CPT | Performed by: UROLOGY

## 2021-09-09 PROCEDURE — 81003 URINALYSIS AUTO W/O SCOPE: CPT | Performed by: UROLOGY

## 2021-09-09 NOTE — PROGRESS NOTES
Cystoscopy    Date/Time: 9/9/2021 12:48 PM  Performed by: Svetlana Cooper MD  Authorized by: Svetlana Cooper MD   Preparation: Patient was prepped and draped in the usual sterile fashion.  Local anesthesia used: no    Anesthesia:  Local anesthesia used: no    Sedation:  Patient sedated: no    Patient tolerance: patient tolerated the procedure well with no immediate complications  Comments: Cytoscopy Procedure:     Procedure: Flexible cytoscope was passed per urethra into the bladder without difficulty after proper consent. The bladder was inspected in a systematic meridian fashion. There were no tumors, lesions, stones, or other abnormalities noted within the bladder. Of note, there was no increased vascularity as well. Both ureteral orifices were identified and were normal in appearance. The flexible cytoscope was removed. The patient tolerated the procedure well.

## 2021-09-10 DIAGNOSIS — R35.1 BPH ASSOCIATED WITH NOCTURIA: Primary | ICD-10-CM

## 2021-09-10 DIAGNOSIS — N40.1 BPH ASSOCIATED WITH NOCTURIA: Primary | ICD-10-CM

## 2021-09-14 ENCOUNTER — TELEPHONE (OUTPATIENT)
Dept: FAMILY MEDICINE CLINIC | Facility: CLINIC | Age: 61
End: 2021-09-14

## 2021-10-12 ENCOUNTER — FLU SHOT (OUTPATIENT)
Dept: VACCINE CLINIC | Facility: HOSPITAL | Age: 61
End: 2021-10-12

## 2021-10-12 DIAGNOSIS — Z23 NEED FOR INFLUENZA VACCINATION: Primary | ICD-10-CM

## 2022-02-09 ENCOUNTER — OFFICE VISIT (OUTPATIENT)
Dept: SURGERY | Facility: CLINIC | Age: 62
End: 2022-02-09

## 2022-02-09 ENCOUNTER — PREP FOR SURGERY (OUTPATIENT)
Dept: OTHER | Facility: HOSPITAL | Age: 62
End: 2022-02-09

## 2022-02-09 VITALS — HEIGHT: 73 IN | BODY MASS INDEX: 33.93 KG/M2 | HEART RATE: 56 BPM | WEIGHT: 256 LBS | RESPIRATION RATE: 16 BRPM

## 2022-02-09 DIAGNOSIS — Z12.11 SCREENING FOR MALIGNANT NEOPLASM OF COLON: Primary | ICD-10-CM

## 2022-02-09 DIAGNOSIS — Z86.010 HISTORY OF COLONIC POLYPS: ICD-10-CM

## 2022-02-09 PROBLEM — Z86.0100 HISTORY OF COLONIC POLYPS: Status: ACTIVE | Noted: 2022-02-09

## 2022-02-09 PROCEDURE — S0285 CNSLT BEFORE SCREEN COLONOSC: HCPCS | Performed by: NURSE PRACTITIONER

## 2022-02-09 RX ORDER — POLYETHYLENE GLYCOL 3350 17 G/17G
POWDER, FOR SOLUTION ORAL
Qty: 238 PACKET | Refills: 0 | Status: ON HOLD | OUTPATIENT
Start: 2022-02-09 | End: 2022-05-06

## 2022-02-09 RX ORDER — SODIUM CHLORIDE 0.9 % (FLUSH) 0.9 %
3 SYRINGE (ML) INJECTION EVERY 12 HOURS SCHEDULED
Status: CANCELLED | OUTPATIENT
Start: 2022-02-09

## 2022-02-09 RX ORDER — SODIUM CHLORIDE 0.9 % (FLUSH) 0.9 %
10 SYRINGE (ML) INJECTION AS NEEDED
Status: CANCELLED | OUTPATIENT
Start: 2022-02-09

## 2022-02-09 NOTE — PROGRESS NOTES
Chief Complaint: Colonoscopy (consult)    Subjective      Colonoscopy consultation         History of Present Illness  Davi Garcia is a 62 y.o. male presents to [facility] for colonoscopy consultation.    Patient presents today on referral from Dr. Davi Bettencourt.    Patient presents today without complaints for screening colonoscopy.    He denies any abdominal pain, change in bowel habit, or rectal bleeding.    Denies any family history of colorectal cancer.    Admits to a history of colonic polyps.    10/16: Colonoscopy (Sg): Cecum - tubular adenoma    7/13: Colonoscopy(Sg): Transverse - tubular adenoma; Descending - 2 tubular adenomas  Objective     Past Medical History:   Diagnosis Date   • High blood pressure    • High cholesterol    • Kidney stones     HX of       Past Surgical History:   Procedure Laterality Date   • COLONOSCOPY         Outpatient Medications Marked as Taking for the 2/9/22 encounter (Office Visit) with Deniz April, APRN   Medication Sig Dispense Refill   • amLODIPine-benazepril (LOTREL 5-20) 5-20 MG per capsule Take 1 capsule by mouth Daily. 90 capsule 1   • Omega-3 Fatty Acids (fish oil) 1200 MG capsule capsule Fish Oil 360-1,200 mg oral capsule,delayed release(DR/EC) take 1 capsule by oral route daily for 90 days   Active         Allergies   Allergen Reactions   • Statins Other (See Comments)     Leg cramps   • Zetia [Ezetimibe] Myalgia        Family History   Problem Relation Age of Onset   • Urolithiasis Father        Social History     Socioeconomic History   • Marital status:    Tobacco Use   • Smoking status: Never Smoker   • Smokeless tobacco: Never Used   Vaping Use   • Vaping Use: Never used   Substance and Sexual Activity   • Alcohol use: Yes     Comment: Current some day  drinks rarely; beer   • Drug use: Never       Review of Systems   Constitutional: Negative for chills and fever.   Gastrointestinal: Negative for abdominal  "distention, abdominal pain, anal bleeding, blood in stool, constipation, diarrhea and rectal pain.        Vital Signs:   Pulse 56   Resp 16   Ht 185.4 cm (73\")   Wt 116 kg (256 lb)   BMI 33.78 kg/m²      Physical Exam  Constitutional:       Appearance: Normal appearance.   HENT:      Head: Normocephalic.   Cardiovascular:      Rate and Rhythm: Normal rate.   Pulmonary:      Effort: Pulmonary effort is normal.   Abdominal:      General: Abdomen is flat.      Palpations: Abdomen is soft.   Skin:     General: Skin is warm and dry.   Neurological:      General: No focal deficit present.      Mental Status: He is alert and oriented to person, place, and time.   Psychiatric:         Mood and Affect: Mood normal.         Thought Content: Thought content normal.          Result Review :              []  Laboratory  []  Radiology  [x]  Pathology  []  Microbiology  []  EKG/Telemetry   []  Cardiology/Vascular   [x]  Old records       Assessment and Plan    Diagnoses and all orders for this visit:    1. Screening for malignant neoplasm of colon (Primary)    2. History of colonic polyps    Other orders  -     polyethylene glycol (MIRALAX) 17 g packet; Take as directed.  Instructions given in office.  Dispense: 238 g bottle  Dispense: 238 packet; Refill: 0    orange prep    Follow Up   Return for Schedule colonoscopy with Dr. Copeland on 5/6/2022 at [facility].     Hospital arrival time 7 AM    Possible risks/complications, benefits, and alternatives to surgical or invasive procedure have been explained to patient and/or legal guardian.     Patient has been evaluated and can tolerate anesthesia and/or sedation. Risks, benefits, and alternatives to anesthesia and sedation have been explained to patient and/or legal guardian.  Patient verbalizes understanding is willing to proceed with the above plan.    Patient was given instructions and counseling regarding his condition or for health maintenance advice. Please " see specific information pulled into the AVS if appropriate.

## 2022-03-01 ENCOUNTER — OFFICE VISIT (OUTPATIENT)
Dept: FAMILY MEDICINE CLINIC | Facility: CLINIC | Age: 62
End: 2022-03-01

## 2022-03-01 ENCOUNTER — LAB (OUTPATIENT)
Dept: LAB | Facility: HOSPITAL | Age: 62
End: 2022-03-01

## 2022-03-01 VITALS
DIASTOLIC BLOOD PRESSURE: 74 MMHG | BODY MASS INDEX: 34.06 KG/M2 | OXYGEN SATURATION: 97 % | WEIGHT: 257 LBS | TEMPERATURE: 97.8 F | SYSTOLIC BLOOD PRESSURE: 120 MMHG | HEART RATE: 57 BPM | HEIGHT: 73 IN

## 2022-03-01 DIAGNOSIS — R97.20 ELEVATED PSA: ICD-10-CM

## 2022-03-01 DIAGNOSIS — Z11.59 NEED FOR HEPATITIS C SCREENING TEST: ICD-10-CM

## 2022-03-01 DIAGNOSIS — I10 ESSENTIAL HYPERTENSION: ICD-10-CM

## 2022-03-01 DIAGNOSIS — Z00.00 ANNUAL PHYSICAL EXAM: Primary | ICD-10-CM

## 2022-03-01 DIAGNOSIS — Z23 NEED FOR TETANUS BOOSTER: ICD-10-CM

## 2022-03-01 DIAGNOSIS — Z13.29 THYROID DISORDER SCREENING: ICD-10-CM

## 2022-03-01 DIAGNOSIS — E78.5 HYPERLIPIDEMIA, UNSPECIFIED HYPERLIPIDEMIA TYPE: ICD-10-CM

## 2022-03-01 LAB
ALBUMIN SERPL-MCNC: 4.5 G/DL (ref 3.5–5.2)
ALBUMIN/GLOB SERPL: 2 G/DL
ALP SERPL-CCNC: 71 U/L (ref 39–117)
ALT SERPL W P-5'-P-CCNC: 18 U/L (ref 1–41)
ANION GAP SERPL CALCULATED.3IONS-SCNC: 11 MMOL/L (ref 5–15)
AST SERPL-CCNC: 16 U/L (ref 1–40)
BASOPHILS # BLD AUTO: 0.03 10*3/MM3 (ref 0–0.2)
BASOPHILS NFR BLD AUTO: 0.8 % (ref 0–1.5)
BILIRUB SERPL-MCNC: 0.4 MG/DL (ref 0–1.2)
BUN SERPL-MCNC: 28 MG/DL (ref 8–23)
BUN/CREAT SERPL: 26.9 (ref 7–25)
CALCIUM SPEC-SCNC: 9 MG/DL (ref 8.6–10.5)
CHLORIDE SERPL-SCNC: 107 MMOL/L (ref 98–107)
CHOLEST SERPL-MCNC: 233 MG/DL (ref 0–200)
CO2 SERPL-SCNC: 22 MMOL/L (ref 22–29)
CREAT SERPL-MCNC: 1.04 MG/DL (ref 0.76–1.27)
DEPRECATED RDW RBC AUTO: 38.7 FL (ref 37–54)
EGFRCR SERPLBLD CKD-EPI 2021: 81.7 ML/MIN/1.73
EOSINOPHIL # BLD AUTO: 0.06 10*3/MM3 (ref 0–0.4)
EOSINOPHIL NFR BLD AUTO: 1.5 % (ref 0.3–6.2)
ERYTHROCYTE [DISTWIDTH] IN BLOOD BY AUTOMATED COUNT: 12 % (ref 12.3–15.4)
GLOBULIN UR ELPH-MCNC: 2.3 GM/DL
GLUCOSE SERPL-MCNC: 104 MG/DL (ref 65–99)
HCT VFR BLD AUTO: 45.9 % (ref 37.5–51)
HCV AB SER DONR QL: NORMAL
HDLC SERPL-MCNC: 43 MG/DL (ref 40–60)
HGB BLD-MCNC: 15.9 G/DL (ref 13–17.7)
IMM GRANULOCYTES # BLD AUTO: 0.01 10*3/MM3 (ref 0–0.05)
IMM GRANULOCYTES NFR BLD AUTO: 0.3 % (ref 0–0.5)
LDLC SERPL CALC-MCNC: 171 MG/DL (ref 0–100)
LDLC/HDLC SERPL: 3.92 {RATIO}
LYMPHOCYTES # BLD AUTO: 1.13 10*3/MM3 (ref 0.7–3.1)
LYMPHOCYTES NFR BLD AUTO: 28.8 % (ref 19.6–45.3)
MCH RBC QN AUTO: 30.9 PG (ref 26.6–33)
MCHC RBC AUTO-ENTMCNC: 34.6 G/DL (ref 31.5–35.7)
MCV RBC AUTO: 89.3 FL (ref 79–97)
MONOCYTES # BLD AUTO: 0.39 10*3/MM3 (ref 0.1–0.9)
MONOCYTES NFR BLD AUTO: 9.9 % (ref 5–12)
NEUTROPHILS NFR BLD AUTO: 2.31 10*3/MM3 (ref 1.7–7)
NEUTROPHILS NFR BLD AUTO: 58.7 % (ref 42.7–76)
NRBC BLD AUTO-RTO: 0 /100 WBC (ref 0–0.2)
PLATELET # BLD AUTO: 193 10*3/MM3 (ref 140–450)
PMV BLD AUTO: 9.7 FL (ref 6–12)
POTASSIUM SERPL-SCNC: 4.3 MMOL/L (ref 3.5–5.2)
PROT SERPL-MCNC: 6.8 G/DL (ref 6–8.5)
PSA SERPL-MCNC: 6.27 NG/ML (ref 0–4)
RBC # BLD AUTO: 5.14 10*6/MM3 (ref 4.14–5.8)
SODIUM SERPL-SCNC: 140 MMOL/L (ref 136–145)
TRIGL SERPL-MCNC: 108 MG/DL (ref 0–150)
TSH SERPL DL<=0.05 MIU/L-ACNC: 1.65 UIU/ML (ref 0.27–4.2)
VLDLC SERPL-MCNC: 19 MG/DL (ref 5–40)
WBC NRBC COR # BLD: 3.93 10*3/MM3 (ref 3.4–10.8)

## 2022-03-01 PROCEDURE — 80061 LIPID PANEL: CPT

## 2022-03-01 PROCEDURE — 99396 PREV VISIT EST AGE 40-64: CPT | Performed by: NURSE PRACTITIONER

## 2022-03-01 PROCEDURE — 36415 COLL VENOUS BLD VENIPUNCTURE: CPT

## 2022-03-01 PROCEDURE — 99213 OFFICE O/P EST LOW 20 MIN: CPT | Performed by: NURSE PRACTITIONER

## 2022-03-01 PROCEDURE — 90471 IMMUNIZATION ADMIN: CPT | Performed by: NURSE PRACTITIONER

## 2022-03-01 PROCEDURE — 80050 GENERAL HEALTH PANEL: CPT

## 2022-03-01 PROCEDURE — 90715 TDAP VACCINE 7 YRS/> IM: CPT | Performed by: NURSE PRACTITIONER

## 2022-03-01 PROCEDURE — 86803 HEPATITIS C AB TEST: CPT

## 2022-03-01 PROCEDURE — 84153 ASSAY OF PSA TOTAL: CPT | Performed by: NURSE PRACTITIONER

## 2022-03-01 RX ORDER — AMLODIPINE BESYLATE AND BENAZEPRIL HYDROCHLORIDE 5; 20 MG/1; MG/1
1 CAPSULE ORAL DAILY
Qty: 90 CAPSULE | Refills: 1 | Status: SHIPPED | OUTPATIENT
Start: 2022-03-01 | End: 2022-08-31 | Stop reason: SDUPTHER

## 2022-03-01 NOTE — PROGRESS NOTES
Chief Complaint  Follow-up (6 month), Hypertension, and Hyperlipidemia  Annual wellness visit  Subjective          Davi Garcia presents to [facility]  History of Present Illness  Annual physical exam with lab work  Hypertension:  Patient is taking Amlodipine-Benazepril.  Patient's Blood Pressure in clinic today is 120/74.  Patient monitors blood pressure at home with readings of [id number].  Patient denies chest pain, shortness of air, headache, flushing, abnormal swelling in feet/ankles.     Hyperlipidemia:  Patient is taking Fish Oil.  Patient denies nocturnal leg cramps, myalgias.  Patient attempts to maintain a diet low in fat and carbohydrates.    Pt has been seeing urology for elevated PSA level, he has had full work up and was told everything was fine. He has follow up with them in September.       Past Medical History:   Diagnosis Date   • High blood pressure    • High cholesterol    • Kidney stones     HX of         Allergies   Allergen Reactions   • Statins Other (See Comments)     Leg cramps   • Zetia [Ezetimibe] Myalgia          Past Surgical History:   Procedure Laterality Date   • COLONOSCOPY            Social History     Tobacco Use   • Smoking status: Never Smoker   • Smokeless tobacco: Never Used   Substance Use Topics   • Alcohol use: Yes     Comment: Current some day  drinks rarely; beer         Family History   Problem Relation Age of Onset   • Urolithiasis Father           Current Outpatient Medications on File Prior to Visit   Medication Sig   • Omega-3 Fatty Acids (fish oil) 1200 MG capsule capsule Fish Oil 360-1,200 mg oral capsule,delayed release(DR/EC) take 1 capsule by oral route daily for 90 days   Active   • polyethylene glycol (MIRALAX) 17 g packet Take as directed.  Instructions given in office.  Dispense: 238 g bottle     No current facility-administered medications on file prior to visit.         Immunization History   Administered  "Date(s) Administered   • COVID-19 (PFIZER) PURPLE CAP 04/05/2021, 04/27/2021, 12/18/2021   • FluLaval/Fluarix/Fluzone >6 10/12/2021   • Tdap 03/01/2022         /74 (BP Location: Left arm, Patient Position: Sitting, Cuff Size: Adult)   Pulse 57   Temp 97.8 °F (36.6 °C) (Oral)   Ht 185.4 cm (73\")   Wt 117 kg (257 lb)   SpO2 97%   BMI 33.91 kg/m²             Physical Exam  Vitals reviewed.   Constitutional:       Appearance: Normal appearance. He is well-developed.   HENT:      Head: Normocephalic and atraumatic.      Right Ear: External ear normal.      Left Ear: External ear normal.      Mouth/Throat:      Pharynx: No oropharyngeal exudate.   Eyes:      Conjunctiva/sclera: Conjunctivae normal.      Pupils: Pupils are equal, round, and reactive to light.   Cardiovascular:      Rate and Rhythm: Normal rate and regular rhythm.      Heart sounds: No murmur heard.  No friction rub. No gallop.    Pulmonary:      Effort: Pulmonary effort is normal.      Breath sounds: Normal breath sounds. No wheezing or rhonchi.   Skin:     General: Skin is warm and dry.   Neurological:      Mental Status: He is alert and oriented to person, place, and time.      Cranial Nerves: No cranial nerve deficit.   Psychiatric:         Mood and Affect: Mood and affect normal.         Behavior: Behavior normal.         Thought Content: Thought content normal.         Judgment: Judgment normal.             Result Review :                           Assessment and Plan      Diagnoses and all orders for this visit:    1. Annual physical exam (Primary)    2. Essential hypertension  Comments:  b/p well controlled, cont current medications.  Orders:  -     Comprehensive Metabolic Panel; Future  -     CBC & Differential; Future  -     amLODIPine-benazepril (LOTREL 5-20) 5-20 MG per capsule; Take 1 capsule by mouth Daily.  Dispense: 90 capsule; Refill: 1    3. Hyperlipidemia, unspecified hyperlipidemia type  Comments:  stable, cont current " medications, will check lab  Orders:  -     Lipid Panel; Future    4. Need for hepatitis C screening test  -     Hepatitis C antibody; Future    5. Thyroid disorder screening  -     TSH; Future    6. Elevated PSA  -     PSA DIAGNOSTIC    7. Need for tetanus booster  -     Tdap Vaccine Greater Than or Equal To 6yo IM      “Discussed risks/benefits to vaccination, reviewed components of the vaccine, discussed VIS, discussed informed consent, informed consent obtained. Patient/Parent was allowed to accept or refuse vaccine. Questions answered to satisfactory state of patient/Parent. We reviewed typical age appropriate and seasonally appropriate vaccinations. Reviewed immunization history and updated state vaccination form as needed. Patient was counseled on Tdap    The patient is advised to follow a low fat, low cholesterol diet, continue current medications and continue current healthy lifestyle patterns.        Follow Up     Return in about 6 months (around 9/1/2022).    Patient was given instructions and counseling regarding his condition or for health maintenance advice. Please see specific information pulled into the AVS if appropriate.

## 2022-03-03 ENCOUNTER — TELEPHONE (OUTPATIENT)
Dept: FAMILY MEDICINE CLINIC | Facility: CLINIC | Age: 62
End: 2022-03-03

## 2022-03-03 NOTE — TELEPHONE ENCOUNTER
----- Message from YAMILET Page sent at 3/2/2022  8:37 AM EST -----  Still elevated and higher than previous. Recommend follow-up with urology.

## 2022-03-03 NOTE — TELEPHONE ENCOUNTER
----- Message from YAMILET Page sent at 3/2/2022  8:38 AM EST -----  Glucose elevated add hemoglobin A1c, total cholesterol improved but still elevated, tighter diet control, otherwise normal lab

## 2022-05-05 NOTE — PRE-PROCEDURE INSTRUCTIONS
Pt. Instructed on laxative and skin prep, pre-op meds, clear liquid diet.No meds  a.m. of procedure.

## 2022-05-06 ENCOUNTER — ANESTHESIA EVENT (OUTPATIENT)
Dept: GASTROENTEROLOGY | Facility: HOSPITAL | Age: 62
End: 2022-05-06

## 2022-05-06 ENCOUNTER — HOSPITAL ENCOUNTER (OUTPATIENT)
Facility: HOSPITAL | Age: 62
Setting detail: HOSPITAL OUTPATIENT SURGERY
Discharge: HOME | End: 2022-05-06
Attending: SURGERY | Admitting: SURGERY

## 2022-05-06 ENCOUNTER — ANESTHESIA (OUTPATIENT)
Dept: GASTROENTEROLOGY | Facility: HOSPITAL | Age: 62
End: 2022-05-06

## 2022-05-06 VITALS
DIASTOLIC BLOOD PRESSURE: 72 MMHG | HEART RATE: 50 BPM | RESPIRATION RATE: 14 BRPM | HEIGHT: 73 IN | SYSTOLIC BLOOD PRESSURE: 114 MMHG | BODY MASS INDEX: 32.96 KG/M2 | WEIGHT: 248.68 LBS | TEMPERATURE: 97.2 F | OXYGEN SATURATION: 95 %

## 2022-05-06 DIAGNOSIS — Z86.010 HISTORY OF COLONIC POLYPS: ICD-10-CM

## 2022-05-06 DIAGNOSIS — Z12.11 SCREENING FOR MALIGNANT NEOPLASM OF COLON: ICD-10-CM

## 2022-05-06 PROCEDURE — 88305 TISSUE EXAM BY PATHOLOGIST: CPT | Performed by: SURGERY

## 2022-05-06 PROCEDURE — 25010000002 PROPOFOL 10 MG/ML EMULSION: Performed by: NURSE ANESTHETIST, CERTIFIED REGISTERED

## 2022-05-06 DEVICE — DEV CLIP ENDO RESOLUTION360 CONTRL ROT 235CM: Type: IMPLANTABLE DEVICE | Site: COLON | Status: FUNCTIONAL

## 2022-05-06 RX ORDER — PROPOFOL 10 MG/ML
VIAL (ML) INTRAVENOUS AS NEEDED
Status: DISCONTINUED | OUTPATIENT
Start: 2022-05-06 | End: 2022-05-06 | Stop reason: SURG

## 2022-05-06 RX ORDER — SODIUM CHLORIDE, SODIUM LACTATE, POTASSIUM CHLORIDE, CALCIUM CHLORIDE 600; 310; 30; 20 MG/100ML; MG/100ML; MG/100ML; MG/100ML
30 INJECTION, SOLUTION INTRAVENOUS CONTINUOUS
Status: DISCONTINUED | OUTPATIENT
Start: 2022-05-06 | End: 2022-05-06 | Stop reason: HOSPADM

## 2022-05-06 RX ORDER — LIDOCAINE HYDROCHLORIDE 20 MG/ML
INJECTION, SOLUTION EPIDURAL; INFILTRATION; INTRACAUDAL; PERINEURAL AS NEEDED
Status: DISCONTINUED | OUTPATIENT
Start: 2022-05-06 | End: 2022-05-06 | Stop reason: SURG

## 2022-05-06 RX ORDER — SODIUM CHLORIDE 0.9 % (FLUSH) 0.9 %
3 SYRINGE (ML) INJECTION EVERY 12 HOURS SCHEDULED
Status: DISCONTINUED | OUTPATIENT
Start: 2022-05-06 | End: 2022-05-06 | Stop reason: HOSPADM

## 2022-05-06 RX ORDER — SODIUM CHLORIDE 0.9 % (FLUSH) 0.9 %
10 SYRINGE (ML) INJECTION AS NEEDED
Status: DISCONTINUED | OUTPATIENT
Start: 2022-05-06 | End: 2022-05-06 | Stop reason: HOSPADM

## 2022-05-06 RX ADMIN — PROPOFOL 100 MG: 10 INJECTION, EMULSION INTRAVENOUS at 08:06

## 2022-05-06 RX ADMIN — PROPOFOL 150 MCG/KG/MIN: 10 INJECTION, EMULSION INTRAVENOUS at 08:08

## 2022-05-06 RX ADMIN — SODIUM CHLORIDE, POTASSIUM CHLORIDE, SODIUM LACTATE AND CALCIUM CHLORIDE 30 ML/HR: 600; 310; 30; 20 INJECTION, SOLUTION INTRAVENOUS at 07:19

## 2022-05-06 RX ADMIN — LIDOCAINE HYDROCHLORIDE 60 MG: 20 INJECTION, SOLUTION EPIDURAL; INFILTRATION; INTRACAUDAL; PERINEURAL at 08:06

## 2022-05-06 NOTE — H&P
Colonoscopy consultation           History of Present Illness  Davi Garcia is a 62 y.o. male presents to [facility] for colonoscopy consultation.     Patient presents today on referral from Dr. Davi Bettencourt.     Patient presents today without complaints for screening colonoscopy.     He denies any abdominal pain, change in bowel habit, or rectal bleeding.     Denies any family history of colorectal cancer.     Admits to a history of colonic polyps.     10/16: Colonoscopy (Sg): Cecum - tubular adenoma     7/13: Colonoscopy(Sg): Transverse - tubular adenoma; Descending - 2 tubular adenomas        Objective         Medical History        Past Medical History:   Diagnosis Date   • High blood pressure     • High cholesterol     • Kidney stones       HX of            Surgical History         Past Surgical History:   Procedure Laterality Date   • COLONOSCOPY                Medications Taking          Outpatient Medications Marked as Taking for the 2/9/22 encounter (Office Visit) with Deniz April, APRN   Medication Sig Dispense Refill   • amLODIPine-benazepril (LOTREL 5-20) 5-20 MG per capsule Take 1 capsule by mouth Daily. 90 capsule 1   • Omega-3 Fatty Acids (fish oil) 1200 MG capsule capsule Fish Oil 360-1,200 mg oral capsule,delayed release(DR/EC) take 1 capsule by oral route daily for 90 days   Active                      Allergies   Allergen Reactions   • Statins Other (See Comments)       Leg cramps   • Zetia [Ezetimibe] Myalgia               Family History   Problem Relation Age of Onset   • Urolithiasis Father           Social History   Social History            Socioeconomic History   • Marital status:    Tobacco Use   • Smoking status: Never Smoker   • Smokeless tobacco: Never Used   Vaping Use   • Vaping Use: Never used   Substance and Sexual Activity   • Alcohol use: Yes       Comment: Current some day  drinks rarely; beer   • Drug use: Never     "        Review of Systems   Constitutional: Negative for chills and fever.   Gastrointestinal: Negative for abdominal distention, abdominal pain, anal bleeding, blood in stool, constipation, diarrhea and rectal pain.         Vital Signs:   Pulse 56   Resp 16   Ht 185.4 cm (73\")   Wt 116 kg (256 lb)   BMI 33.78 kg/m²      Physical Exam  Constitutional:       Appearance: Normal appearance.   HENT:      Head: Normocephalic.   Cardiovascular:      Rate and Rhythm: Normal rate.   Pulmonary:      Effort: Pulmonary effort is normal.   Abdominal:      General: Abdomen is flat.      Palpations: Abdomen is soft.   Skin:     General: Skin is warm and dry.   Neurological:      General: No focal deficit present.      Mental Status: He is alert and oriented to person, place, and time.   Psychiatric:         Mood and Affect: Mood normal.         Thought Content: Thought content normal.                  Result Review    :                  []?  Laboratory  []?  Radiology  [x]?  Pathology  []?  Microbiology  []?  EKG/Telemetry   []?  Cardiology/Vascular   [x]?  Old records           Assessment      Assessment and Plan    Diagnoses and all orders for this visit:     1. Screening for malignant neoplasm of colon (Primary)     2. History of colonic polyps     Other orders  -     polyethylene glycol (MIRALAX) 17 g packet; Take as directed.  Instructions given in office.  Dispense: 238 g bottle  Dispense: 238 packet; Refill: 0     orange prep     Follow Up   Return for Schedule colonoscopy with Dr. Copeland on 5/6/2022 at [facility].      Hospital arrival time 7 AM     Possible risks/complications, benefits, and alternatives to surgical or invasive procedure have been explained to patient and/or legal guardian.     Patient has been evaluated and can tolerate anesthesia and/or sedation. Risks, benefits, and alternatives to anesthesia and sedation have been explained to patient and/or legal guardian.  Patient verbalizes " understanding is willing to proceed with the above plan.     Patient was given instructions and counseling regarding his condition or for health maintenance advice. Please see specific information pulled into the AVS if appropriate.

## 2022-05-06 NOTE — ANESTHESIA POSTPROCEDURE EVALUATION
Patient: Davi Garcia    Procedure Summary     Date: 05/06/22 Room / Location: [facility] ENDOSCOPY 5 / [facility] ENDOSCOPY    Anesthesia Start: 0802 Anesthesia Stop:     Procedure: COLONOSCOPY with biopies and cold snares/ clip (N/A ) Diagnosis:       Screening for malignant neoplasm of colon      History of colonic polyps      (Screening for malignant neoplasm of colon [Z12.11])      (History of colonic polyps [Z86.010])    Surgeons: Jairo Copeland MD Provider: Aries Muniz MD    Anesthesia Type: general ASA Status: 2          Anesthesia Type: general    Vitals  Vitals Value Taken Time   /66 05/06/22 0853   Temp 36.3 °C (97.3 °F) 05/06/22 0847   Pulse 52 05/06/22 0853   Resp 14 05/06/22 0853   SpO2 96 % 05/06/22 0853           Post Anesthesia Care and Evaluation    Patient location during evaluation: bedside  Patient participation: complete - patient participated  Level of consciousness: awake  Pain management: adequate  Airway patency: patent  Anesthetic complications: No anesthetic complications  PONV Status: none  Cardiovascular status: acceptable and stable  Respiratory status: acceptable  Hydration status: acceptable    Comments: An Anesthesiologist personally participated in the most demanding procedures (including induction and emergence if applicable) in the anesthesia plan, monitored the course of anesthesia administration at frequent intervals and remained physically present and available for immediate diagnosis and treatment of emergencies.

## 2022-05-06 NOTE — ANESTHESIA PREPROCEDURE EVALUATION
Anesthesia Evaluation     Patient summary reviewed and Nursing notes reviewed   no history of anesthetic complications:  NPO Solid Status: > 8 hours  NPO Liquid Status: > 2 hours           Airway   Mallampati: II  TM distance: >3 FB  Neck ROM: full  No difficulty expected  Dental      Pulmonary - negative pulmonary ROS and normal exam    breath sounds clear to auscultation  Cardiovascular - normal exam  Exercise tolerance: good (4-7 METS)    Rhythm: regular  Rate: normal    (+) hypertension,       Neuro/Psych- negative ROS  GI/Hepatic/Renal/Endo    (+)   renal disease,     Musculoskeletal (-) negative ROS    Abdominal    Substance History - negative use     OB/GYN negative ob/gyn ROS         Other - negative ROS                       Anesthesia Plan    ASA 2     general       Anesthetic plan, all risks, benefits, and alternatives have been provided, discussed and informed consent has been obtained with: patient.        CODE STATUS:

## 2022-05-09 LAB
CYTO UR: NORMAL
LAB AP CASE REPORT: NORMAL
LAB AP CLINICAL INFORMATION: NORMAL
PATH REPORT.FINAL DX SPEC: NORMAL
PATH REPORT.GROSS SPEC: NORMAL

## 2022-05-24 ENCOUNTER — TELEPHONE (OUTPATIENT)
Dept: FAMILY MEDICINE CLINIC | Facility: CLINIC | Age: 62
End: 2022-05-24

## 2022-05-24 ENCOUNTER — OFFICE VISIT (OUTPATIENT)
Dept: FAMILY MEDICINE CLINIC | Facility: CLINIC | Age: 62
End: 2022-05-24

## 2022-05-24 ENCOUNTER — HOSPITAL ENCOUNTER (OUTPATIENT)
Dept: GENERAL RADIOLOGY | Facility: HOSPITAL | Age: 62
Discharge: HOME | End: 2022-05-24
Admitting: NURSE PRACTITIONER

## 2022-05-24 VITALS
SYSTOLIC BLOOD PRESSURE: 124 MMHG | HEIGHT: 73 IN | DIASTOLIC BLOOD PRESSURE: 68 MMHG | TEMPERATURE: 97.6 F | OXYGEN SATURATION: 96 % | HEART RATE: 53 BPM | WEIGHT: 253.5 LBS | BODY MASS INDEX: 33.6 KG/M2

## 2022-05-24 DIAGNOSIS — M25.561 RIGHT KNEE PAIN, UNSPECIFIED CHRONICITY: Primary | ICD-10-CM

## 2022-05-24 DIAGNOSIS — M25.561 CHRONIC PAIN OF RIGHT KNEE: ICD-10-CM

## 2022-05-24 DIAGNOSIS — M25.561 CHRONIC PAIN OF RIGHT KNEE: Primary | ICD-10-CM

## 2022-05-24 DIAGNOSIS — G89.29 CHRONIC PAIN OF RIGHT KNEE: ICD-10-CM

## 2022-05-24 DIAGNOSIS — G89.29 CHRONIC PAIN OF RIGHT KNEE: Primary | ICD-10-CM

## 2022-05-24 PROCEDURE — 99213 OFFICE O/P EST LOW 20 MIN: CPT | Performed by: NURSE PRACTITIONER

## 2022-05-24 PROCEDURE — 73562 X-RAY EXAM OF KNEE 3: CPT

## 2022-05-24 RX ORDER — MELOXICAM 15 MG/1
15 TABLET ORAL DAILY
Qty: 30 TABLET | Refills: 1 | Status: SHIPPED | OUTPATIENT
Start: 2022-05-24 | End: 2022-08-01

## 2022-05-24 NOTE — PROGRESS NOTES
Chief Complaint  Knee Pain (Right)    Subjective          Davi Garcia presents to [facility]  Knee Pain   There was no injury mechanism. The pain is present in the right knee, right thigh and right hip. The quality of the pain is described as aching. The pain is at a severity of 5/10. The pain has been worsening since onset. Associated symptoms include an inability to bear weight and muscle weakness. He reports no foreign bodies present. The symptoms are aggravated by movement, palpation and weight bearing. He has tried NSAIDs for the symptoms. The treatment provided mild relief.       Patient complaining of right knee pain X 1 year or longer, NKI.  Patient describes pain as intermittent, worse with working and physical activity, aching, located on the outer side of knee.  Patient states he feel a 'catch', feels like it wants to pop out.  Patient states he sometimes has difficulty with stairs.    Past Medical History:   Diagnosis Date   • High blood pressure    • High cholesterol    • Kidney stones     HX of         Allergies   Allergen Reactions   • Statins Other (See Comments)     Leg cramps   • Zetia [Ezetimibe] Myalgia          Past Surgical History:   Procedure Laterality Date   • COLONOSCOPY     • COLONOSCOPY N/A 05/06/2022    Procedure: COLONOSCOPY with biopies and cold snares/ clip;  Surgeon: Jairo Copeland MD;  Location: [facility] ENDOSCOPY;  Service: General;  Laterality: N/A;  colon polyps    • VASECTOMY            Social History     Tobacco Use   • Smoking status: Never Smoker   • Smokeless tobacco: Never Used   Substance Use Topics   • Alcohol use: Yes     Alcohol/week: 1.0 standard drink     Types: 1 Cans of beer per week     Comment: Current some day  drinks rarely; beer         Family History   Problem Relation Age of Onset   • Urolithiasis Father    • Malig Hyperthermia Neg Hx           Current Outpatient Medications on File Prior to Visit   Medication Sig  "  • amLODIPine-benazepril (LOTREL 5-20) 5-20 MG per capsule Take 1 capsule by mouth Daily. (Patient taking differently: Take 1 capsule by mouth Every Night.)   • Omega-3 Fatty Acids (fish oil) 1200 MG capsule capsule 1,200 mg Every Night.     No current facility-administered medications on file prior to visit.         Immunization History   Administered Date(s) Administered   • COVID-19 (PFIZER) PURPLE CAP 04/05/2021, 04/27/2021, 12/18/2021   • FluLaval/Fluarix/Fluzone >6 10/12/2021   • Tdap 03/01/2022         /68 (BP Location: Left arm, Patient Position: Sitting, Cuff Size: Adult)   Pulse 53   Temp 97.6 °F (36.4 °C) (Oral)   Ht 185.4 cm (73\")   Wt 115 kg (253 lb 8 oz)   SpO2 96%   BMI 33.45 kg/m²             Physical Exam  Vitals reviewed.   Constitutional:       Appearance: Normal appearance. He is well-developed.   HENT:      Head: Normocephalic and atraumatic.      Right Ear: External ear normal.      Left Ear: External ear normal.      Mouth/Throat:      Pharynx: No oropharyngeal exudate.   Eyes:      Conjunctiva/sclera: Conjunctivae normal.      Pupils: Pupils are equal, round, and reactive to light.   Cardiovascular:      Rate and Rhythm: Normal rate and regular rhythm.      Heart sounds: No murmur heard.    No friction rub. No gallop.   Pulmonary:      Effort: Pulmonary effort is normal.      Breath sounds: Normal breath sounds. No wheezing or rhonchi.   Musculoskeletal:      Right knee: Normal.      Left knee: Normal.   Skin:     General: Skin is warm and dry.   Neurological:      Mental Status: He is alert and oriented to person, place, and time.      Cranial Nerves: No cranial nerve deficit.   Psychiatric:         Mood and Affect: Mood and affect normal.         Behavior: Behavior normal.         Thought Content: Thought content normal.         Judgment: Judgment normal.             Result Review :                           Assessment and Plan      Diagnoses and all orders for this " visit:    1. Chronic pain of right knee (Primary)  Comments:  Trial of Mobic 15 mg daily, side effects and administration addressed.  No additional NSAID usage.  Orders:  -     XR Knee 3 View Right; Future  -     meloxicam (MOBIC) 15 MG tablet; Take 1 tablet by mouth Daily.  Dispense: 30 tablet; Refill: 1              Follow Up     Return if symptoms worsen or fail to improve.    Patient was given instructions and counseling regarding his condition or for health maintenance advice. Please see specific information pulled into the AVS if appropriate.

## 2022-05-24 NOTE — TELEPHONE ENCOUNTER
----- Message from YAMILET Page sent at 5/24/2022  9:06 AM EDT -----  Normal knee x-ray, proceed with MRI of knee.  I have placed order

## 2022-05-25 ENCOUNTER — OFFICE VISIT (OUTPATIENT)
Dept: SURGERY | Facility: CLINIC | Age: 62
End: 2022-05-25

## 2022-05-25 VITALS — BODY MASS INDEX: 33.93 KG/M2 | WEIGHT: 256 LBS | HEIGHT: 73 IN | RESPIRATION RATE: 16 BRPM

## 2022-05-25 DIAGNOSIS — Z12.11 SCREENING FOR MALIGNANT NEOPLASM OF COLON: Primary | ICD-10-CM

## 2022-05-25 PROCEDURE — 99212 OFFICE O/P EST SF 10 MIN: CPT | Performed by: SURGERY

## 2022-05-25 NOTE — PROGRESS NOTES
"Chief Complaint  Post-op    Subjective          Davi Garcia presents to [facility] for follow-up after colonoscopy.    History of Present Illness  The patient reports that he is doing well overall. He had 7 benign polyps found on colonoscopy. The patient was found to have small hemorrhoids and denies any issues with his hemorrhoids. He states that he has occasional itching, but it is tolerable.     The patient is concerned about the clips that were placed at colonoscopy and reports that he needs an MRI of his knee. He states that he had an x-ray yesterday and was told that he needed to have an MRI of his knee. The patient does not have an MRI appointment yet. He asked when it would be safe to have the MRI.     Objective   Vital Signs:  Resp 16   Ht 185.4 cm (73\")   Wt 116 kg (256 lb)   BMI 33.78 kg/m²         Physical Exam  Constitutional:       General: He is not in acute distress.     Appearance: Normal appearance. He is well-developed and normal weight.      Comments: No acute distress.   Pulmonary:      Effort: Pulmonary effort is normal.      Breath sounds: Normal air entry.      Comments: Nonlabored breathing.  Abdominal:      General: There is no distension.      Tenderness: There is no abdominal tenderness.      Comments: Abdomen is soft.          Result Review :{Labs  Result Review  Imaging  Med Tab  Media  Procedures :23}                 Assessment and Plan    There are no diagnoses linked to this encounter.    Assessment and Plan   Diagnoses and all orders for this visit:     Status post screening colonoscopy with the findings of multiple polyps and hemorrhoids.  - Screening colonoscopy in 1 year.  - He is not currently having symptoms from the hemorrhoids, so he does not require any treatment.    Discussed with the patient - all questions were answered they voiced understanding and agreed to proceed with above plan       Follow Up   No follow-ups on " file.  Patient was given instructions and counseling regarding his condition or for health maintenance advice. Please see specific information pulled into the AVS if appropriate.     Transcribed from ambient dictation for Jairo Copeland MD by Julia Braxton.  05/25/22   13:40 EDT    Patient verbalized consent to the visit recording.

## 2022-07-05 ENCOUNTER — HOSPITAL ENCOUNTER (OUTPATIENT)
Dept: MRI IMAGING | Facility: HOSPITAL | Age: 62
Discharge: HOME | End: 2022-07-05
Admitting: NURSE PRACTITIONER

## 2022-07-05 DIAGNOSIS — M25.561 RIGHT KNEE PAIN, UNSPECIFIED CHRONICITY: ICD-10-CM

## 2022-07-05 PROCEDURE — 73721 MRI JNT OF LWR EXTRE W/O DYE: CPT

## 2022-07-11 ENCOUNTER — TELEPHONE (OUTPATIENT)
Dept: FAMILY MEDICINE CLINIC | Facility: CLINIC | Age: 62
End: 2022-07-11

## 2022-07-11 DIAGNOSIS — S83.281A TEAR OF LATERAL MENISCUS OF RIGHT KNEE, CURRENT, UNSPECIFIED TEAR TYPE, INITIAL ENCOUNTER: Primary | ICD-10-CM

## 2022-07-28 ENCOUNTER — OFFICE VISIT (OUTPATIENT)
Dept: ORTHOPEDIC SURGERY | Facility: CLINIC | Age: 62
End: 2022-07-28

## 2022-07-28 VITALS — OXYGEN SATURATION: 97 % | WEIGHT: 250.4 LBS | HEART RATE: 58 BPM | HEIGHT: 73 IN | BODY MASS INDEX: 33.19 KG/M2

## 2022-07-28 DIAGNOSIS — S83.001A SUBLUXATION OF RIGHT PATELLA, INITIAL ENCOUNTER: ICD-10-CM

## 2022-07-28 DIAGNOSIS — S83.281A TEAR OF LATERAL MENISCUS OF RIGHT KNEE, CURRENT, UNSPECIFIED TEAR TYPE, INITIAL ENCOUNTER: ICD-10-CM

## 2022-07-28 DIAGNOSIS — M17.11 OSTEOARTHRITIS OF RIGHT KNEE, UNSPECIFIED OSTEOARTHRITIS TYPE: Primary | ICD-10-CM

## 2022-07-28 PROCEDURE — 99203 OFFICE O/P NEW LOW 30 MIN: CPT | Performed by: ORTHOPAEDIC SURGERY

## 2022-07-28 NOTE — PROGRESS NOTES
"Chief Complaint  Initial Evaluation of the Right Knee     Subjective      Davi Garcia presents to [facility] for evaluation of the right knee. The patient reports right knee pain that started after his knee gave out about 1 1/2 year ago while running. He reports recently he had some instability to the knee going down stairs. He has no other complaints.     Allergies   Allergen Reactions   • Statins Other (See Comments)     Leg cramps   • Zetia [Ezetimibe] Myalgia        Social History     Socioeconomic History   • Marital status:    Tobacco Use   • Smoking status: Never Smoker   • Smokeless tobacco: Never Used   Vaping Use   • Vaping Use: Never used   Substance and Sexual Activity   • Alcohol use: Yes     Alcohol/week: 1.0 standard drink     Types: 1 Cans of beer per week     Comment: Current some day  drinks rarely; beer   • Drug use: Never   • Sexual activity: Yes     Partners: Female     Birth control/protection: Surgical     Comment: Vasectomy        Review of Systems     Objective   Vital Signs:   Pulse 58   Ht 185.4 cm (73\")   Wt 114 kg (250 lb 6.4 oz)   SpO2 97%   BMI 33.04 kg/m²       Physical Exam  Constitutional:       Appearance: Normal appearance. The patient is well-developed and normal weight.   HENT:      Head: Normocephalic.      Right Ear: Hearing and external ear normal.      Left Ear: Hearing and external ear normal.      Nose: Nose normal.   Eyes:      Conjunctiva/sclera: Conjunctivae normal.   Cardiovascular:      Rate and Rhythm: Normal rate.   Pulmonary:      Effort: Pulmonary effort is normal.      Breath sounds: No wheezing or rales.   Abdominal:      Palpations: Abdomen is soft.      Tenderness: There is no abdominal tenderness.   Musculoskeletal:      Cervical back: Normal range of motion.   Skin:     Findings: No rash.   Neurological:      Mental Status: The patient is alert and oriented to person, place, and time.   Psychiatric:         " Mood and Affect: Mood and affect normal.         Judgment: Judgment normal.       Ortho Exam      Right knee- tender to the lateral knee. Neurovascularly intact. Positive EHL, FHL, GS and TA. Sensation intact to all 5 nerves of the foot. Positive pulses. Good strength to hamstrings, quadriceps, dorsiflexors, and plantar flexors. ROM 0-130 degrees. 1+ medial and lateral translation. No apprehension. Minimal pain with Kathleen's. Negative Lachman's. Medial joint line tenderness.     Procedures      Imaging Results (Most Recent)     None           Result Review :       MRI Knee Right Without Contrast    Result Date: 7/6/2022  Narrative: PROCEDURE: MRI KNEE RIGHT  WO CONTRAST  COMPARISON: None  INDICATIONS: WORSENING LATERAL RIGHT KNEE PAIN. OFF/ON SWELLING. NO KNOWN INJURY.      TECHNIQUE: A complete multi-planar MRI was performed.   FINDINGS:  There is 0.5 cm lateral subluxation of patella with mild lateral patellar tilt.  No fracture or focal osseous abnormality is seen.  Marrow signal appears normal.  There is a horizontal undersurface tear of the lateral meniscal posterior horn.  The tear extends into the body and anterior horn.  No tear of the medial meniscus is demonstrated.  The cruciate ligaments, medial collateral ligament, lateral collateral ligament complex, patellar retinacula and extensor mechanism appear intact.  No significant joint effusion is seen.  Cartilage signal changes in the joint are consistent with chondromalacia.  No focal cartilage loss is identified.  No loose body is seen.  No popliteal cyst is seen.      Impression:   1. 0.5 cm lateral subluxation of the patella with mild lateral patellar tilt 2. Tear of the lateral meniscus, as above 3. Mild osteoarthrosis     Ramses Hoff M.D.       Electronically Signed and Approved By: Ramses Hoff M.D. on 7/06/2022 at 8:30                      Assessment and Plan     Diagnoses and all orders for this visit:    1. Osteoarthritis of right knee,  unspecified osteoarthritis type (Primary)    2. Tear of lateral meniscus of right knee, current, unspecified tear type, initial encounter    3. Subluxation of right patella, initial encounter        Discussed the treatment plan with the patient.  I reviewed his x-rays and MRI of the knee. Plan for conservative treatment. Plan to continue NSAIDS. Home exercises given today.     Call or return if worsening symptoms.    Follow Up     PRN      Patient was given instructions and counseling regarding his condition or for health maintenance advice. Please see specific information pulled into the AVS if appropriate.     Scribed for Jairo Noriega MD by Sheeba Hollis.  07/28/22   16:20 EDT    I have personally performed the services described in this document as scribed by the above individual and it is both accurate and complete. Jairo Noriega MD 07/30/22

## 2022-07-30 DIAGNOSIS — M25.561 CHRONIC PAIN OF RIGHT KNEE: ICD-10-CM

## 2022-07-30 DIAGNOSIS — G89.29 CHRONIC PAIN OF RIGHT KNEE: ICD-10-CM

## 2022-08-01 RX ORDER — MELOXICAM 15 MG/1
TABLET ORAL
Qty: 30 TABLET | Refills: 1 | Status: SHIPPED | OUTPATIENT
Start: 2022-08-01 | End: 2022-08-31 | Stop reason: SDUPTHER

## 2022-08-31 ENCOUNTER — OFFICE VISIT (OUTPATIENT)
Dept: FAMILY MEDICINE CLINIC | Facility: CLINIC | Age: 62
End: 2022-08-31

## 2022-08-31 ENCOUNTER — LAB (OUTPATIENT)
Dept: LAB | Facility: HOSPITAL | Age: 62
End: 2022-08-31

## 2022-08-31 VITALS
OXYGEN SATURATION: 97 % | BODY MASS INDEX: 33.13 KG/M2 | DIASTOLIC BLOOD PRESSURE: 73 MMHG | WEIGHT: 250 LBS | SYSTOLIC BLOOD PRESSURE: 120 MMHG | HEART RATE: 57 BPM | HEIGHT: 73 IN | TEMPERATURE: 97.9 F

## 2022-08-31 DIAGNOSIS — I10 ESSENTIAL HYPERTENSION: Primary | ICD-10-CM

## 2022-08-31 DIAGNOSIS — G89.29 CHRONIC PAIN OF RIGHT KNEE: ICD-10-CM

## 2022-08-31 DIAGNOSIS — I10 ESSENTIAL HYPERTENSION: ICD-10-CM

## 2022-08-31 DIAGNOSIS — E78.5 HYPERLIPIDEMIA, UNSPECIFIED HYPERLIPIDEMIA TYPE: ICD-10-CM

## 2022-08-31 DIAGNOSIS — Z13.29 SCREENING FOR THYROID DISORDER: ICD-10-CM

## 2022-08-31 DIAGNOSIS — M25.561 CHRONIC PAIN OF RIGHT KNEE: ICD-10-CM

## 2022-08-31 LAB
ALBUMIN SERPL-MCNC: 4.4 G/DL (ref 3.5–5.2)
ALBUMIN/GLOB SERPL: 2 G/DL
ALP SERPL-CCNC: 64 U/L (ref 39–117)
ALT SERPL W P-5'-P-CCNC: 16 U/L (ref 1–41)
ANION GAP SERPL CALCULATED.3IONS-SCNC: 8.1 MMOL/L (ref 5–15)
AST SERPL-CCNC: 18 U/L (ref 1–40)
BASOPHILS # BLD AUTO: 0.03 10*3/MM3 (ref 0–0.2)
BASOPHILS NFR BLD AUTO: 0.7 % (ref 0–1.5)
BILIRUB SERPL-MCNC: 0.6 MG/DL (ref 0–1.2)
BUN SERPL-MCNC: 29 MG/DL (ref 8–23)
BUN/CREAT SERPL: 26.6 (ref 7–25)
CALCIUM SPEC-SCNC: 9.2 MG/DL (ref 8.6–10.5)
CHLORIDE SERPL-SCNC: 108 MMOL/L (ref 98–107)
CHOLEST SERPL-MCNC: 215 MG/DL (ref 0–200)
CO2 SERPL-SCNC: 23.9 MMOL/L (ref 22–29)
CREAT SERPL-MCNC: 1.09 MG/DL (ref 0.76–1.27)
DEPRECATED RDW RBC AUTO: 39.2 FL (ref 37–54)
EGFRCR SERPLBLD CKD-EPI 2021: 77.2 ML/MIN/1.73
EOSINOPHIL # BLD AUTO: 0.08 10*3/MM3 (ref 0–0.4)
EOSINOPHIL NFR BLD AUTO: 2 % (ref 0.3–6.2)
ERYTHROCYTE [DISTWIDTH] IN BLOOD BY AUTOMATED COUNT: 12.2 % (ref 12.3–15.4)
GLOBULIN UR ELPH-MCNC: 2.2 GM/DL
GLUCOSE SERPL-MCNC: 96 MG/DL (ref 65–99)
HCT VFR BLD AUTO: 43.6 % (ref 37.5–51)
HDLC SERPL-MCNC: 42 MG/DL (ref 40–60)
HGB BLD-MCNC: 15.4 G/DL (ref 13–17.7)
IMM GRANULOCYTES # BLD AUTO: 0.01 10*3/MM3 (ref 0–0.05)
IMM GRANULOCYTES NFR BLD AUTO: 0.2 % (ref 0–0.5)
LDLC SERPL CALC-MCNC: 154 MG/DL (ref 0–100)
LDLC/HDLC SERPL: 3.62 {RATIO}
LYMPHOCYTES # BLD AUTO: 1.14 10*3/MM3 (ref 0.7–3.1)
LYMPHOCYTES NFR BLD AUTO: 27.8 % (ref 19.6–45.3)
MCH RBC QN AUTO: 31.2 PG (ref 26.6–33)
MCHC RBC AUTO-ENTMCNC: 35.3 G/DL (ref 31.5–35.7)
MCV RBC AUTO: 88.3 FL (ref 79–97)
MONOCYTES # BLD AUTO: 0.37 10*3/MM3 (ref 0.1–0.9)
MONOCYTES NFR BLD AUTO: 9 % (ref 5–12)
NEUTROPHILS NFR BLD AUTO: 2.47 10*3/MM3 (ref 1.7–7)
NEUTROPHILS NFR BLD AUTO: 60.3 % (ref 42.7–76)
NRBC BLD AUTO-RTO: 0 /100 WBC (ref 0–0.2)
PLATELET # BLD AUTO: 184 10*3/MM3 (ref 140–450)
PMV BLD AUTO: 9.7 FL (ref 6–12)
POTASSIUM SERPL-SCNC: 4.5 MMOL/L (ref 3.5–5.2)
PROT SERPL-MCNC: 6.6 G/DL (ref 6–8.5)
RBC # BLD AUTO: 4.94 10*6/MM3 (ref 4.14–5.8)
SODIUM SERPL-SCNC: 140 MMOL/L (ref 136–145)
TRIGL SERPL-MCNC: 105 MG/DL (ref 0–150)
TSH SERPL DL<=0.05 MIU/L-ACNC: 1.31 UIU/ML (ref 0.27–4.2)
VLDLC SERPL-MCNC: 19 MG/DL (ref 5–40)
WBC NRBC COR # BLD: 4.1 10*3/MM3 (ref 3.4–10.8)

## 2022-08-31 PROCEDURE — 99214 OFFICE O/P EST MOD 30 MIN: CPT | Performed by: NURSE PRACTITIONER

## 2022-08-31 PROCEDURE — 36415 COLL VENOUS BLD VENIPUNCTURE: CPT

## 2022-08-31 PROCEDURE — 80061 LIPID PANEL: CPT

## 2022-08-31 PROCEDURE — 80050 GENERAL HEALTH PANEL: CPT

## 2022-08-31 RX ORDER — AMLODIPINE BESYLATE AND BENAZEPRIL HYDROCHLORIDE 5; 20 MG/1; MG/1
1 CAPSULE ORAL DAILY
Qty: 90 CAPSULE | Refills: 1 | Status: SHIPPED | OUTPATIENT
Start: 2022-08-31 | End: 2023-03-06 | Stop reason: SDUPTHER

## 2022-08-31 RX ORDER — MELOXICAM 15 MG/1
15 TABLET ORAL DAILY
Qty: 90 TABLET | Refills: 1 | Status: SHIPPED | OUTPATIENT
Start: 2022-08-31

## 2022-08-31 NOTE — PROGRESS NOTES
"Chief Complaint  Follow-up (6 months), Hypertension, and Hyperlipidemia    SUBJECTIVE  Davi Garcia presents to [facility]     Hypertension:  Patient is taking Amlodipine-Benazepril.  Patient's Blood Pressure in clinic today is 120/73.  Patient monitors blood pressure at home with readings of 120s-130s/70s-80s.  Patient denies chest pain, shortness of air, headache, flushing, abnormal swelling in feet/ankles.     Hyperlipidemia:  Patient is taking Fish Oil.  Patient denies nocturnal leg cramps, myalgias.  Patient attempts to maintain a diet low in fat and carbohydrates.    Chronic knee pain:  Patient started on Meloxicam at last office visit.  He states he has gotten significant pain relief.  Patient did see orthopedic that said his  Was fine he just had some arthritis.    History of Present Illness  Past Medical History:   Diagnosis Date   • High blood pressure    • High cholesterol    • Kidney stones     HX of      Family History   Problem Relation Age of Onset   • Urolithiasis Father    • Malig Hyperthermia Neg Hx       Past Surgical History:   Procedure Laterality Date   • COLONOSCOPY     • COLONOSCOPY N/A 05/06/2022    Procedure: COLONOSCOPY with biopies and cold snares/ clip;  Surgeon: Jairo Copeland MD;  Location: [facility] ENDOSCOPY;  Service: General;  Laterality: N/A;  colon polyps    • VASECTOMY          Current Outpatient Medications:   •  amLODIPine-benazepril (LOTREL 5-20) 5-20 MG per capsule, Take 1 capsule by mouth Daily., Disp: 90 capsule, Rfl: 1  •  meloxicam (MOBIC) 15 MG tablet, Take 1 tablet by mouth Daily., Disp: 90 tablet, Rfl: 1  •  Omega-3 Fatty Acids (fish oil) 1200 MG capsule capsule, 1,200 mg Every Night., Disp: , Rfl:     OBJECTIVE  Vital Signs:   /73 (BP Location: Left arm, Patient Position: Sitting, Cuff Size: Adult)   Pulse 57   Temp 97.9 °F (36.6 °C) (Oral)   Ht 185.4 cm (73\")   Wt 113 kg (250 lb)   SpO2 97%   BMI 32.98 kg/m²  " "  Estimated body mass index is 32.98 kg/m² as calculated from the following:    Height as of this encounter: 185.4 cm (73\").    Weight as of this encounter: 113 kg (250 lb).     Wt Readings from Last 3 Encounters:   08/31/22 113 kg (250 lb)   07/28/22 114 kg (250 lb 6.4 oz)   05/25/22 116 kg (256 lb)     BP Readings from Last 3 Encounters:   08/31/22 120/73   05/24/22 124/68   05/06/22 114/72       Physical Exam  Vitals reviewed.   Constitutional:       Appearance: Normal appearance. He is well-developed.   HENT:      Head: Normocephalic and atraumatic.      Right Ear: External ear normal.      Left Ear: External ear normal.      Mouth/Throat:      Pharynx: No oropharyngeal exudate.   Eyes:      Conjunctiva/sclera: Conjunctivae normal.      Pupils: Pupils are equal, round, and reactive to light.   Cardiovascular:      Rate and Rhythm: Normal rate and regular rhythm.      Heart sounds: No murmur heard.    No friction rub. No gallop.   Pulmonary:      Effort: Pulmonary effort is normal.      Breath sounds: Normal breath sounds. No wheezing or rhonchi.   Skin:     General: Skin is warm and dry.   Neurological:      Mental Status: He is alert and oriented to person, place, and time.      Cranial Nerves: No cranial nerve deficit.   Psychiatric:         Mood and Affect: Mood and affect normal.         Behavior: Behavior normal.         Thought Content: Thought content normal.         Judgment: Judgment normal.          Result Review        XR Knee 3 View Right    Result Date: 5/24/2022   Negative radiographs of the right knee.      LEVI HAYES MD       Electronically Signed and Approved By: LEVI HAYES MD on 5/24/2022 at 8:17             MRI Knee Right Without Contrast    Result Date: 7/6/2022    1. 0.5 cm lateral subluxation of the patella with mild lateral patellar tilt 2. Tear of the lateral meniscus, as above 3. Mild osteoarthrosis     Ramses Hoff M.D.       Electronically Signed and Approved By: Ramses Hoff M.D. " on 7/06/2022 at 8:30                 The above data has been reviewed by YAMILET Page 08/31/2022 08:51 EDT.          Patient Care Team:  Carmela Willis APRN as PCP - General (Family Medicine)    BMI is >= 30 and <35. (Class 1 Obesity). The following options were offered after discussion;: weight loss educational material (shared in after visit summary)       ASSESSMENT & PLAN    Diagnoses and all orders for this visit:    1. Essential hypertension (Primary)  Comments:  BP well controlled, continue current medication  Orders:  -     Comprehensive Metabolic Panel; Future  -     CBC & Differential; Future  -     amLODIPine-benazepril (LOTREL 5-20) 5-20 MG per capsule; Take 1 capsule by mouth Daily.  Dispense: 90 capsule; Refill: 1    2. Hyperlipidemia, unspecified hyperlipidemia type  Comments:  Continue fish oil, will check lab  Orders:  -     Lipid Panel; Future    3. Screening for thyroid disorder  -     TSH; Future    4. Essential hypertension  Comments:  b/p well controlled, cont current medications.  Orders:  -     Comprehensive Metabolic Panel; Future  -     CBC & Differential; Future  -     amLODIPine-benazepril (LOTREL 5-20) 5-20 MG per capsule; Take 1 capsule by mouth Daily.  Dispense: 90 capsule; Refill: 1    5. Chronic pain of right knee  Comments:  Continue Mobic as needed  Orders:  -     meloxicam (MOBIC) 15 MG tablet; Take 1 tablet by mouth Daily.  Dispense: 90 tablet; Refill: 1         Tobacco Use: Low Risk    • Smoking Tobacco Use: Never Smoker   • Smokeless Tobacco Use: Never Used       Follow Up     Return in about 6 months (around 2/28/2023).      Patient was given instructions and counseling regarding his condition or for health maintenance advice. Please see specific information pulled into the AVS if appropriate.   I have reviewed information obtained and documented by others and I have confirmed the accuracy of this documented note.    YAMILET Page

## 2022-09-09 ENCOUNTER — TELEPHONE (OUTPATIENT)
Dept: UROLOGY | Facility: CLINIC | Age: 62
End: 2022-09-09

## 2022-09-12 NOTE — TELEPHONE ENCOUNTER
2ND [facility] FOR PT TO CALL OFFICE BACK TO R/S CX APPT WITH DR. BURCH FROM 9/12/MS

## 2022-09-13 NOTE — TELEPHONE ENCOUNTER
3RD ATTEMPT LVM FOR PT TO CALL OFFICE BACK TO R/S CX APPT WITH DR. BURCH FROM 9/12/MS ANYTHING ELSE TO DO?

## 2022-12-01 DIAGNOSIS — I10 ESSENTIAL HYPERTENSION: ICD-10-CM

## 2022-12-01 RX ORDER — AMLODIPINE BESYLATE AND BENAZEPRIL HYDROCHLORIDE 5; 20 MG/1; MG/1
1 CAPSULE ORAL DAILY
Qty: 90 CAPSULE | Refills: 1 | Status: CANCELLED | OUTPATIENT
Start: 2022-12-01

## 2022-12-01 NOTE — TELEPHONE ENCOUNTER
.    Caller: Davi Garcia    Relationship: Self    Best call back number: [phone]    Requested Prescriptions:   Requested Prescriptions     Pending Prescriptions Disp Refills   • amLODIPine-benazepril (LOTREL 5-20) 5-20 MG per capsule 90 capsule 1     Sig: Take 1 capsule by mouth Daily.        Pharmacy where request should be sent: [facility] #[id number] - [place], [place] - [address] [facility] [phone]  - [phone] FX     Additional details provided by patient:   Does the patient have less than a 3 day supply:  [] Yes  [x] No     Devendra Villaseñor Rep   12/01/22 14:00 EST

## 2023-03-06 ENCOUNTER — OFFICE VISIT (OUTPATIENT)
Dept: FAMILY MEDICINE CLINIC | Facility: CLINIC | Age: 63
End: 2023-03-06
Payer: COMMERCIAL

## 2023-03-06 ENCOUNTER — LAB (OUTPATIENT)
Dept: LAB | Facility: HOSPITAL | Age: 63
End: 2023-03-06
Payer: COMMERCIAL

## 2023-03-06 VITALS
TEMPERATURE: 97.6 F | HEART RATE: 55 BPM | OXYGEN SATURATION: 98 % | HEIGHT: 73 IN | DIASTOLIC BLOOD PRESSURE: 72 MMHG | WEIGHT: 251.2 LBS | BODY MASS INDEX: 33.29 KG/M2 | SYSTOLIC BLOOD PRESSURE: 126 MMHG

## 2023-03-06 DIAGNOSIS — Z12.5 PROSTATE CANCER SCREENING: ICD-10-CM

## 2023-03-06 DIAGNOSIS — I10 ESSENTIAL HYPERTENSION: ICD-10-CM

## 2023-03-06 DIAGNOSIS — M25.561 CHRONIC PAIN OF RIGHT KNEE: ICD-10-CM

## 2023-03-06 DIAGNOSIS — I10 ESSENTIAL HYPERTENSION: Primary | ICD-10-CM

## 2023-03-06 DIAGNOSIS — Z13.29 SCREENING FOR THYROID DISORDER: ICD-10-CM

## 2023-03-06 DIAGNOSIS — Z12.11 COLON CANCER SCREENING: ICD-10-CM

## 2023-03-06 DIAGNOSIS — E78.5 HYPERLIPIDEMIA, UNSPECIFIED HYPERLIPIDEMIA TYPE: ICD-10-CM

## 2023-03-06 DIAGNOSIS — G89.29 CHRONIC PAIN OF RIGHT KNEE: ICD-10-CM

## 2023-03-06 DIAGNOSIS — R73.09 ELEVATED GLUCOSE: ICD-10-CM

## 2023-03-06 LAB
ALBUMIN SERPL-MCNC: 4.6 G/DL (ref 3.5–5.2)
ALBUMIN/GLOB SERPL: 2 G/DL
ALP SERPL-CCNC: 72 U/L (ref 39–117)
ALT SERPL W P-5'-P-CCNC: 17 U/L (ref 1–41)
ANION GAP SERPL CALCULATED.3IONS-SCNC: 6.1 MMOL/L (ref 5–15)
AST SERPL-CCNC: 15 U/L (ref 1–40)
BASOPHILS # BLD AUTO: 0.04 10*3/MM3 (ref 0–0.2)
BASOPHILS NFR BLD AUTO: 1 % (ref 0–1.5)
BILIRUB SERPL-MCNC: 0.4 MG/DL (ref 0–1.2)
BUN SERPL-MCNC: 26 MG/DL (ref 8–23)
BUN/CREAT SERPL: 26.5 (ref 7–25)
CALCIUM SPEC-SCNC: 9.9 MG/DL (ref 8.6–10.5)
CHLORIDE SERPL-SCNC: 104 MMOL/L (ref 98–107)
CHOLEST SERPL-MCNC: 222 MG/DL (ref 0–200)
CO2 SERPL-SCNC: 27.9 MMOL/L (ref 22–29)
CREAT SERPL-MCNC: 0.98 MG/DL (ref 0.76–1.27)
DEPRECATED RDW RBC AUTO: 38.9 FL (ref 37–54)
EGFRCR SERPLBLD CKD-EPI 2021: 87.2 ML/MIN/1.73
EOSINOPHIL # BLD AUTO: 0.09 10*3/MM3 (ref 0–0.4)
EOSINOPHIL NFR BLD AUTO: 2.2 % (ref 0.3–6.2)
ERYTHROCYTE [DISTWIDTH] IN BLOOD BY AUTOMATED COUNT: 11.8 % (ref 12.3–15.4)
GLOBULIN UR ELPH-MCNC: 2.3 GM/DL
GLUCOSE SERPL-MCNC: 115 MG/DL (ref 65–99)
HCT VFR BLD AUTO: 45.8 % (ref 37.5–51)
HDLC SERPL-MCNC: 38 MG/DL (ref 40–60)
HGB BLD-MCNC: 15.8 G/DL (ref 13–17.7)
IMM GRANULOCYTES # BLD AUTO: 0 10*3/MM3 (ref 0–0.05)
IMM GRANULOCYTES NFR BLD AUTO: 0 % (ref 0–0.5)
LDLC SERPL CALC-MCNC: 150 MG/DL (ref 0–100)
LDLC/HDLC SERPL: 3.87 {RATIO}
LYMPHOCYTES # BLD AUTO: 1.14 10*3/MM3 (ref 0.7–3.1)
LYMPHOCYTES NFR BLD AUTO: 28.2 % (ref 19.6–45.3)
MCH RBC QN AUTO: 30.9 PG (ref 26.6–33)
MCHC RBC AUTO-ENTMCNC: 34.5 G/DL (ref 31.5–35.7)
MCV RBC AUTO: 89.6 FL (ref 79–97)
MONOCYTES # BLD AUTO: 0.3 10*3/MM3 (ref 0.1–0.9)
MONOCYTES NFR BLD AUTO: 7.4 % (ref 5–12)
NEUTROPHILS NFR BLD AUTO: 2.47 10*3/MM3 (ref 1.7–7)
NEUTROPHILS NFR BLD AUTO: 61.2 % (ref 42.7–76)
NRBC BLD AUTO-RTO: 0 /100 WBC (ref 0–0.2)
PLATELET # BLD AUTO: 193 10*3/MM3 (ref 140–450)
PMV BLD AUTO: 9.9 FL (ref 6–12)
POTASSIUM SERPL-SCNC: 4.9 MMOL/L (ref 3.5–5.2)
PROT SERPL-MCNC: 6.9 G/DL (ref 6–8.5)
PSA SERPL-MCNC: 6.3 NG/ML (ref 0–4)
RBC # BLD AUTO: 5.11 10*6/MM3 (ref 4.14–5.8)
SODIUM SERPL-SCNC: 138 MMOL/L (ref 136–145)
TRIGL SERPL-MCNC: 185 MG/DL (ref 0–150)
TSH SERPL DL<=0.05 MIU/L-ACNC: 1.4 UIU/ML (ref 0.27–4.2)
VLDLC SERPL-MCNC: 34 MG/DL (ref 5–40)
WBC NRBC COR # BLD: 4.04 10*3/MM3 (ref 3.4–10.8)

## 2023-03-06 PROCEDURE — 36415 COLL VENOUS BLD VENIPUNCTURE: CPT

## 2023-03-06 PROCEDURE — G0103 PSA SCREENING: HCPCS

## 2023-03-06 PROCEDURE — 80050 GENERAL HEALTH PANEL: CPT

## 2023-03-06 PROCEDURE — 80061 LIPID PANEL: CPT

## 2023-03-06 PROCEDURE — 99214 OFFICE O/P EST MOD 30 MIN: CPT | Performed by: NURSE PRACTITIONER

## 2023-03-06 PROCEDURE — 83036 HEMOGLOBIN GLYCOSYLATED A1C: CPT

## 2023-03-06 RX ORDER — AMLODIPINE BESYLATE AND BENAZEPRIL HYDROCHLORIDE 5; 20 MG/1; MG/1
1 CAPSULE ORAL DAILY
Qty: 90 CAPSULE | Refills: 1 | Status: SHIPPED | OUTPATIENT
Start: 2023-03-06

## 2023-03-06 RX ORDER — VIT C/B6/B5/MAGNESIUM/HERB 173 50-5-6-5MG
1 CAPSULE ORAL DAILY
COMMUNITY

## 2023-03-06 NOTE — PROGRESS NOTES
"Chief Complaint  Follow-up (6 months), Hypertension, Hyperlipidemia, and chronic Knee Pain    SUBJECTIVE  Davi Garcia presents to [facility]     Hypertension:  Patient is taking Amlodipine-Benazepril.  Patient's Blood Pressure in clinic today is 126/72.  Patient monitors blood pressure at home with readings of 130s/70s-80s.  Patient denies chest pain, shortness of air, headache, flushing, abnormal swelling in feet/ankles.     Hyperlipidemia:  Patient is taking Fish Oil.  Patient denies nocturnal leg cramps, myalgias.  Patient attempts to maintain a diet low in fat and carbohydrates.    Chronic Knee Pain:  Patient is taking Meloxicam PRN with good pain control.    History of Present Illness  Past Medical History:   Diagnosis Date   • High blood pressure    • High cholesterol    • Kidney stones     HX of      Family History   Problem Relation Age of Onset   • Urolithiasis Father    • Malig Hyperthermia Neg Hx       Past Surgical History:   Procedure Laterality Date   • COLONOSCOPY     • COLONOSCOPY N/A 05/06/2022    Procedure: COLONOSCOPY with biopies and cold snares/ clip;  Surgeon: Jairo Copelnad MD;  Location: [facility] ENDOSCOPY;  Service: General;  Laterality: N/A;  colon polyps    • VASECTOMY          Current Outpatient Medications:   •  amLODIPine-benazepril (LOTREL 5-20) 5-20 MG per capsule, Take 1 capsule by mouth Daily., Disp: 90 capsule, Rfl: 1  •  meloxicam (MOBIC) 15 MG tablet, Take 1 tablet by mouth Daily., Disp: 90 tablet, Rfl: 1  •  Omega-3 Fatty Acids (fish oil) 1200 MG capsule capsule, 1 capsule Every Night., Disp: , Rfl:   •  Turmeric 500 MG capsule, Take 1 capsule by mouth Daily., Disp: , Rfl:     OBJECTIVE  Vital Signs:   /72 (BP Location: Left arm, Patient Position: Sitting, Cuff Size: Large Adult)   Pulse 55   Temp 97.6 °F (36.4 °C) (Oral)   Ht 185.4 cm (73\")   Wt 114 kg (251 lb 3.2 oz)   SpO2 98%   BMI 33.14 kg/m²    Estimated body mass index " "is 33.14 kg/m² as calculated from the following:    Height as of this encounter: 185.4 cm (73\").    Weight as of this encounter: 114 kg (251 lb 3.2 oz).     Wt Readings from Last 3 Encounters:   03/06/23 114 kg (251 lb 3.2 oz)   08/31/22 113 kg (250 lb)   07/28/22 114 kg (250 lb 6.4 oz)     BP Readings from Last 3 Encounters:   03/06/23 126/72   08/31/22 120/73   05/24/22 124/68       Physical Exam  Vitals reviewed.   Constitutional:       Appearance: Normal appearance. He is well-developed.   HENT:      Head: Normocephalic and atraumatic.      Right Ear: External ear normal.      Left Ear: External ear normal.      Mouth/Throat:      Pharynx: No oropharyngeal exudate.   Eyes:      Conjunctiva/sclera: Conjunctivae normal.      Pupils: Pupils are equal, round, and reactive to light.   Neck:      Vascular: No carotid bruit.   Cardiovascular:      Rate and Rhythm: Normal rate and regular rhythm.      Pulses: Normal pulses.      Heart sounds: Normal heart sounds. No murmur heard.    No friction rub. No gallop.   Pulmonary:      Effort: Pulmonary effort is normal.      Breath sounds: Normal breath sounds. No wheezing or rhonchi.   Skin:     General: Skin is warm and dry.   Neurological:      Mental Status: He is alert and oriented to person, place, and time.      Cranial Nerves: No cranial nerve deficit.   Psychiatric:         Mood and Affect: Mood and affect normal.         Behavior: Behavior normal.         Thought Content: Thought content normal.         Judgment: Judgment normal.          Result Review        No Images in the past 120 days found..      The above data has been reviewed by YAMILET Page 03/06/2023 07:44 EST.          Patient Care Team:  Carmela Willis APRN as PCP - General (Family Medicine)    BMI is >= 30 and <35. (Class 1 Obesity). The following options were offered after discussion;: weight loss educational material (shared in after visit summary)       ASSESSMENT & PLAN    Diagnoses and " all orders for this visit:    1. Essential hypertension (Primary)  Comments:  b/p well controlled, cont current medications.  Orders:  -     Comprehensive Metabolic Panel; Future  -     CBC & Differential; Future  -     amLODIPine-benazepril (LOTREL 5-20) 5-20 MG per capsule; Take 1 capsule by mouth Daily.  Dispense: 90 capsule; Refill: 1    2. Hyperlipidemia, unspecified hyperlipidemia type  Comments:  Continue fish oil and diet control, will check lab work  Orders:  -     Lipid Panel; Future    3. Chronic pain of right knee  Comments:  Pain controlled with as needed meloxicam, continue medication.    4. Screening for thyroid disorder  -     TSH; Future    5. Colon cancer screening  -     Ambulatory Referral For Screening Colonoscopy    6. Prostate cancer screening  -     PSA Screen; Future         Tobacco Use: Low Risk    • Smoking Tobacco Use: Never   • Smokeless Tobacco Use: Never   • Passive Exposure: Not on file       Follow Up     Return in about 6 months (around 9/6/2023).      Patient was given instructions and counseling regarding his condition or for health maintenance advice. Please see specific information pulled into the AVS if appropriate.   I have reviewed information obtained and documented by others and I have confirmed the accuracy of this documented note.    YAMILET Page

## 2023-03-07 DIAGNOSIS — R73.09 ELEVATED GLUCOSE: Primary | ICD-10-CM

## 2023-03-07 LAB — HBA1C MFR BLD: 5.6 % (ref 4.8–5.6)

## 2023-03-21 DIAGNOSIS — R97.20 ELEVATED PSA: Primary | ICD-10-CM

## 2023-05-30 ENCOUNTER — TELEPHONE (OUTPATIENT)
Dept: FAMILY MEDICINE CLINIC | Facility: CLINIC | Age: 63
End: 2023-05-30

## 2023-05-30 NOTE — TELEPHONE ENCOUNTER
Caller: Davi Garcia    Relationship: Self    Best call back number: [phone]    What is the medical concern/diagnosis: COLONOSCOPY FOLLOW-UP    What specialty or service is being requested: GASTROENTEROLOGIST    What is the provider, practice or medical service name: [facility]     What is the office location: [address] [place] [address]    What is the office phone number: [phone]    Any additional details: THE PATIENT WOULD LIKE A REFERRAL FOR A NEW  DOCTOR FOR A COLONOSCOPY FOLLOW-UP. HE WOULD LIKE A CALL BACK TO CONFIRM

## 2023-05-31 DIAGNOSIS — Z91.89 HIGH RISK FOR COLON CANCER: Primary | ICD-10-CM

## 2023-06-02 ENCOUNTER — TELEPHONE (OUTPATIENT)
Dept: FAMILY MEDICINE CLINIC | Facility: CLINIC | Age: 63
End: 2023-06-02
Payer: COMMERCIAL

## 2023-08-30 DIAGNOSIS — G89.29 CHRONIC PAIN OF RIGHT KNEE: ICD-10-CM

## 2023-08-30 DIAGNOSIS — M25.561 CHRONIC PAIN OF RIGHT KNEE: ICD-10-CM

## 2023-08-30 RX ORDER — MELOXICAM 15 MG/1
TABLET ORAL
Qty: 90 TABLET | Refills: 1 | Status: SHIPPED | OUTPATIENT
Start: 2023-08-30

## 2023-10-31 ENCOUNTER — LAB (OUTPATIENT)
Dept: LAB | Facility: HOSPITAL | Age: 63
End: 2023-10-31
Payer: COMMERCIAL

## 2023-10-31 ENCOUNTER — TELEPHONE (OUTPATIENT)
Dept: SURGERY | Facility: CLINIC | Age: 63
End: 2023-10-31
Payer: COMMERCIAL

## 2023-10-31 ENCOUNTER — OFFICE VISIT (OUTPATIENT)
Dept: FAMILY MEDICINE CLINIC | Facility: CLINIC | Age: 63
End: 2023-10-31
Payer: COMMERCIAL

## 2023-10-31 VITALS
DIASTOLIC BLOOD PRESSURE: 69 MMHG | BODY MASS INDEX: 30.83 KG/M2 | WEIGHT: 232.6 LBS | HEART RATE: 58 BPM | OXYGEN SATURATION: 98 % | TEMPERATURE: 97.7 F | HEIGHT: 73 IN | SYSTOLIC BLOOD PRESSURE: 125 MMHG

## 2023-10-31 DIAGNOSIS — Z00.00 ANNUAL PHYSICAL EXAM: ICD-10-CM

## 2023-10-31 DIAGNOSIS — Z00.00 ANNUAL PHYSICAL EXAM: Primary | ICD-10-CM

## 2023-10-31 DIAGNOSIS — R73.09 ELEVATED GLUCOSE: ICD-10-CM

## 2023-10-31 DIAGNOSIS — I10 ESSENTIAL HYPERTENSION: ICD-10-CM

## 2023-10-31 LAB
ALBUMIN SERPL-MCNC: 4.5 G/DL (ref 3.5–5.2)
ALBUMIN/GLOB SERPL: 2 G/DL
ALP SERPL-CCNC: 74 U/L (ref 39–117)
ALT SERPL W P-5'-P-CCNC: 13 U/L (ref 1–41)
ANION GAP SERPL CALCULATED.3IONS-SCNC: 9.1 MMOL/L (ref 5–15)
AST SERPL-CCNC: 13 U/L (ref 1–40)
BASOPHILS # BLD AUTO: 0.04 10*3/MM3 (ref 0–0.2)
BASOPHILS NFR BLD AUTO: 0.8 % (ref 0–1.5)
BILIRUB SERPL-MCNC: 0.4 MG/DL (ref 0–1.2)
BUN SERPL-MCNC: 26 MG/DL (ref 8–23)
BUN/CREAT SERPL: 23.9 (ref 7–25)
CALCIUM SPEC-SCNC: 9.4 MG/DL (ref 8.6–10.5)
CHLORIDE SERPL-SCNC: 107 MMOL/L (ref 98–107)
CHOLEST SERPL-MCNC: 231 MG/DL (ref 0–200)
CO2 SERPL-SCNC: 25.9 MMOL/L (ref 22–29)
CREAT SERPL-MCNC: 1.09 MG/DL (ref 0.76–1.27)
DEPRECATED RDW RBC AUTO: 39.3 FL (ref 37–54)
EGFRCR SERPLBLD CKD-EPI 2021: 76.7 ML/MIN/1.73
EOSINOPHIL # BLD AUTO: 0.07 10*3/MM3 (ref 0–0.4)
EOSINOPHIL NFR BLD AUTO: 1.4 % (ref 0.3–6.2)
ERYTHROCYTE [DISTWIDTH] IN BLOOD BY AUTOMATED COUNT: 12 % (ref 12.3–15.4)
GLOBULIN UR ELPH-MCNC: 2.3 GM/DL
GLUCOSE SERPL-MCNC: 105 MG/DL (ref 65–99)
HBA1C MFR BLD: 5.5 % (ref 4.8–5.6)
HCT VFR BLD AUTO: 44.6 % (ref 37.5–51)
HDLC SERPL-MCNC: 44 MG/DL (ref 40–60)
HGB BLD-MCNC: 15.5 G/DL (ref 13–17.7)
IMM GRANULOCYTES # BLD AUTO: 0.01 10*3/MM3 (ref 0–0.05)
IMM GRANULOCYTES NFR BLD AUTO: 0.2 % (ref 0–0.5)
LDLC SERPL CALC-MCNC: 170 MG/DL (ref 0–100)
LDLC/HDLC SERPL: 3.82 {RATIO}
LYMPHOCYTES # BLD AUTO: 1.01 10*3/MM3 (ref 0.7–3.1)
LYMPHOCYTES NFR BLD AUTO: 20.5 % (ref 19.6–45.3)
MCH RBC QN AUTO: 31.4 PG (ref 26.6–33)
MCHC RBC AUTO-ENTMCNC: 34.8 G/DL (ref 31.5–35.7)
MCV RBC AUTO: 90.5 FL (ref 79–97)
MONOCYTES # BLD AUTO: 0.48 10*3/MM3 (ref 0.1–0.9)
MONOCYTES NFR BLD AUTO: 9.8 % (ref 5–12)
NEUTROPHILS NFR BLD AUTO: 3.31 10*3/MM3 (ref 1.7–7)
NEUTROPHILS NFR BLD AUTO: 67.3 % (ref 42.7–76)
NRBC BLD AUTO-RTO: 0 /100 WBC (ref 0–0.2)
PLATELET # BLD AUTO: 209 10*3/MM3 (ref 140–450)
PMV BLD AUTO: 9.8 FL (ref 6–12)
POTASSIUM SERPL-SCNC: 4.4 MMOL/L (ref 3.5–5.2)
PROT SERPL-MCNC: 6.8 G/DL (ref 6–8.5)
RBC # BLD AUTO: 4.93 10*6/MM3 (ref 4.14–5.8)
SODIUM SERPL-SCNC: 142 MMOL/L (ref 136–145)
TRIGL SERPL-MCNC: 94 MG/DL (ref 0–150)
TSH SERPL DL<=0.05 MIU/L-ACNC: 1.47 UIU/ML (ref 0.27–4.2)
VLDLC SERPL-MCNC: 17 MG/DL (ref 5–40)
WBC NRBC COR # BLD: 4.92 10*3/MM3 (ref 3.4–10.8)

## 2023-10-31 PROCEDURE — 83036 HEMOGLOBIN GLYCOSYLATED A1C: CPT

## 2023-10-31 PROCEDURE — 80050 GENERAL HEALTH PANEL: CPT

## 2023-10-31 PROCEDURE — 99396 PREV VISIT EST AGE 40-64: CPT | Performed by: NURSE PRACTITIONER

## 2023-10-31 PROCEDURE — 36415 COLL VENOUS BLD VENIPUNCTURE: CPT

## 2023-10-31 PROCEDURE — 80061 LIPID PANEL: CPT

## 2023-10-31 RX ORDER — AMLODIPINE BESYLATE AND BENAZEPRIL HYDROCHLORIDE 5; 20 MG/1; MG/1
1 CAPSULE ORAL DAILY
Qty: 90 CAPSULE | Refills: 1 | Status: SHIPPED | OUTPATIENT
Start: 2023-10-31

## 2023-10-31 NOTE — TELEPHONE ENCOUNTER
SPOKE TO THE PATIENT AND LET HIM KNOW THE NEXT JOSEPH DATE WOULD BE IN MARCH. HE WANT'S TO LEAVE IT ON FOR 11/10/23 AND WCB IF HE HAS TO R/S IT DUE TO WORK.

## 2023-10-31 NOTE — TELEPHONE ENCOUNTER
Caller: Davi Garcia     Relationship to patient: SELF     Best call back number: [phone]         Type of visit:   COLONOSCOPY SURGERY       If rescheduling, when is the original appointment: 11.10.23    Additional notes: PATIENT WOULD LIKE TO RESCHEDULE

## 2023-10-31 NOTE — PROGRESS NOTES
"Chief Complaint  Annual Exam, Hypertension, and Hyperlipidemia    SUBJECTIVE  Davi Garcia presents to [facility]    Patient presents for annual physical exam.  Patient states he is doing well, without complaint.    Hypertension:  Patient is taking Amlodipine Benazepril.  Patient's Blood Pressure in clinic today is 125/69.  Patient does monitor blood pressure at home with readings of 120s-130s/70s-80s.  Patient denies chest pain, shortness of air, headache, flushing, abnormal swelling in feet/ankles. .    Hyperlipidemia:  Patient is taking Fish Oil.  Patient denies nocturnal leg cramps, myalgias.  Patient attempts to maintain a diet low in fat and carbohydrates.      History of Present Illness  Past Medical History:   Diagnosis Date    High blood pressure     High cholesterol     Kidney stones     HX of      Family History   Problem Relation Age of Onset    Urolithiasis Father     Malig Hyperthermia Neg Hx       Past Surgical History:   Procedure Laterality Date    COLONOSCOPY      COLONOSCOPY N/A 05/06/2022    Procedure: COLONOSCOPY with biopies and cold snares/ clip;  Surgeon: Jairo Copeland MD;  Location: [facility] ENDOSCOPY;  Service: General;  Laterality: N/A;  colon polyps     VASECTOMY          Current Outpatient Medications:     amLODIPine-benazepril (LOTREL 5-20) 5-20 MG per capsule, Take 1 capsule by mouth Daily., Disp: 90 capsule, Rfl: 1    meloxicam (MOBIC) 15 MG tablet, TAKE 1 TABLET BY MOUTH EVERY DAY, Disp: 90 tablet, Rfl: 1    Omega-3 Fatty Acids (fish oil) 1200 MG capsule capsule, 1 capsule Every Night., Disp: , Rfl:     OBJECTIVE  Vital Signs:   /69 (BP Location: Left arm, Patient Position: Sitting, Cuff Size: Large Adult)   Pulse 58   Temp 97.7 °F (36.5 °C) (Oral)   Ht 185.4 cm (73\")   Wt 106 kg (232 lb 9.6 oz)   SpO2 98%   BMI 30.69 kg/m²    Estimated body mass index is 30.69 kg/m² as calculated from the following:    Height as of this " "encounter: 185.4 cm (73\").    Weight as of this encounter: 106 kg (232 lb 9.6 oz).     Wt Readings from Last 3 Encounters:   10/31/23 106 kg (232 lb 9.6 oz)   06/27/23 108 kg (239 lb)   03/06/23 114 kg (251 lb 3.2 oz)     BP Readings from Last 3 Encounters:   10/31/23 125/69   03/06/23 126/72   08/31/22 120/73       Physical Exam  Vitals reviewed.   Constitutional:       Appearance: Normal appearance. He is well-developed.   HENT:      Head: Normocephalic and atraumatic.      Right Ear: External ear normal.      Left Ear: External ear normal.      Mouth/Throat:      Pharynx: No oropharyngeal exudate.   Eyes:      Conjunctiva/sclera: Conjunctivae normal.      Pupils: Pupils are equal, round, and reactive to light.   Neck:      Vascular: No carotid bruit.   Cardiovascular:      Rate and Rhythm: Normal rate and regular rhythm.      Pulses: Normal pulses.      Heart sounds: Normal heart sounds. No murmur heard.     No friction rub. No gallop.   Pulmonary:      Effort: Pulmonary effort is normal.      Breath sounds: Normal breath sounds. No wheezing or rhonchi.   Skin:     General: Skin is warm and dry.   Neurological:      Mental Status: He is alert and oriented to person, place, and time.      Cranial Nerves: No cranial nerve deficit.   Psychiatric:         Mood and Affect: Mood and affect normal.         Behavior: Behavior normal.         Thought Content: Thought content normal.         Judgment: Judgment normal.          Result Review        No Images in the past 120 days found..      The above data has been reviewed by YAMILET Page 10/31/2023 07:52 EDT.          Patient Care Team:  Carmela Willis APRN as PCP - General (Family Medicine)  Deniz, April, YAMILET as Nurse Practitioner (Nurse Practitioner)            ASSESSMENT & PLAN    Diagnoses and all orders for this visit:    1. Annual physical exam (Primary)  -     Comprehensive Metabolic Panel; Future  -     CBC & Differential; Future  -     TSH; Future  - "     Lipid Panel; Future    2. Essential hypertension  Comments:  b/p well controlled, cont current medications.  Orders:  -     amLODIPine-benazepril (LOTREL 5-20) 5-20 MG per capsule; Take 1 capsule by mouth Daily.  Dispense: 90 capsule; Refill: 1    3. Elevated glucose  -     Hemoglobin A1c; Future       The patient is advised to continue current medications, continue current healthy lifestyle patterns, and return for routine annual checkups.    Tobacco Use: Low Risk  (10/31/2023)    Patient History     Smoking Tobacco Use: Never     Smokeless Tobacco Use: Never     Passive Exposure: Not on file       Follow Up     Return in about 6 months (around 4/30/2024).      Patient was given instructions and counseling regarding his condition or for health maintenance advice. Please see specific information pulled into the AVS if appropriate.   I have reviewed information obtained and documented by others and I have confirmed the accuracy of this documented note.    YAMILET Page

## 2023-11-09 ENCOUNTER — ANESTHESIA EVENT (OUTPATIENT)
Dept: GASTROENTEROLOGY | Facility: HOSPITAL | Age: 63
End: 2023-11-09
Payer: COMMERCIAL

## 2023-11-09 NOTE — ANESTHESIA PREPROCEDURE EVALUATION
Anesthesia Evaluation     Patient summary reviewed and Nursing notes reviewed   NPO Solid Status: > 8 hours  NPO Liquid Status: > 2 hours           Airway   Mallampati: II  TM distance: >3 FB  Neck ROM: full  No difficulty expected  Dental      Pulmonary     breath sounds clear to auscultation  Cardiovascular   Exercise tolerance: good (4-7 METS)    Rhythm: regular  Rate: normal    (+) hypertension well controlled, hyperlipidemia    ROS comment: Denies CP/MI/SOB    Neuro/Psych  GI/Hepatic/Renal/Endo    (+) renal disease- stones    Musculoskeletal     Abdominal    Substance History      OB/GYN          Other   arthritis (RT KNEE),                   Anesthesia Plan    ASA 2     general     (Total IV Anesthesia    Patient understands anesthesia not responsible for dental damage.  )  intravenous induction     Anesthetic plan, risks, benefits, and alternatives have been provided, discussed and informed consent has been obtained with: patient.    Plan discussed with CRNA.    CODE STATUS:

## 2023-11-10 ENCOUNTER — HOSPITAL ENCOUNTER (OUTPATIENT)
Facility: HOSPITAL | Age: 63
Setting detail: HOSPITAL OUTPATIENT SURGERY
Discharge: HOME | End: 2023-11-10
Attending: SURGERY | Admitting: SURGERY
Payer: COMMERCIAL

## 2023-11-10 ENCOUNTER — ANESTHESIA (OUTPATIENT)
Dept: GASTROENTEROLOGY | Facility: HOSPITAL | Age: 63
End: 2023-11-10
Payer: COMMERCIAL

## 2023-11-10 VITALS
SYSTOLIC BLOOD PRESSURE: 118 MMHG | OXYGEN SATURATION: 98 % | RESPIRATION RATE: 16 BRPM | TEMPERATURE: 97.2 F | HEART RATE: 63 BPM | WEIGHT: 229.28 LBS | DIASTOLIC BLOOD PRESSURE: 75 MMHG | BODY MASS INDEX: 30.25 KG/M2

## 2023-11-10 DIAGNOSIS — Z86.010 HISTORY OF COLONIC POLYPS: ICD-10-CM

## 2023-11-10 DIAGNOSIS — Z12.11 SCREENING FOR MALIGNANT NEOPLASM OF COLON: ICD-10-CM

## 2023-11-10 PROCEDURE — 25810000003 LACTATED RINGERS PER 1000 ML: Performed by: NURSE ANESTHETIST, CERTIFIED REGISTERED

## 2023-11-10 PROCEDURE — 25010000002 PROPOFOL 10 MG/ML EMULSION: Performed by: NURSE ANESTHETIST, CERTIFIED REGISTERED

## 2023-11-10 PROCEDURE — 88305 TISSUE EXAM BY PATHOLOGIST: CPT | Performed by: SURGERY

## 2023-11-10 RX ORDER — SODIUM CHLORIDE 0.9 % (FLUSH) 0.9 %
10 SYRINGE (ML) INJECTION AS NEEDED
Status: DISCONTINUED | OUTPATIENT
Start: 2023-11-10 | End: 2023-11-10 | Stop reason: HOSPADM

## 2023-11-10 RX ORDER — PROPOFOL 10 MG/ML
VIAL (ML) INTRAVENOUS AS NEEDED
Status: DISCONTINUED | OUTPATIENT
Start: 2023-11-10 | End: 2023-11-10 | Stop reason: SURG

## 2023-11-10 RX ORDER — SODIUM CHLORIDE, SODIUM LACTATE, POTASSIUM CHLORIDE, CALCIUM CHLORIDE 600; 310; 30; 20 MG/100ML; MG/100ML; MG/100ML; MG/100ML
30 INJECTION, SOLUTION INTRAVENOUS CONTINUOUS
Status: DISCONTINUED | OUTPATIENT
Start: 2023-11-10 | End: 2023-11-10 | Stop reason: HOSPADM

## 2023-11-10 RX ORDER — SODIUM CHLORIDE, SODIUM LACTATE, POTASSIUM CHLORIDE, CALCIUM CHLORIDE 600; 310; 30; 20 MG/100ML; MG/100ML; MG/100ML; MG/100ML
INJECTION, SOLUTION INTRAVENOUS CONTINUOUS PRN
Status: DISCONTINUED | OUTPATIENT
Start: 2023-11-10 | End: 2023-11-10 | Stop reason: SURG

## 2023-11-10 RX ORDER — LIDOCAINE HYDROCHLORIDE 20 MG/ML
INJECTION, SOLUTION EPIDURAL; INFILTRATION; INTRACAUDAL; PERINEURAL AS NEEDED
Status: DISCONTINUED | OUTPATIENT
Start: 2023-11-10 | End: 2023-11-10 | Stop reason: SURG

## 2023-11-10 RX ORDER — SODIUM CHLORIDE 9 MG/ML
40 INJECTION, SOLUTION INTRAVENOUS AS NEEDED
Status: DISCONTINUED | OUTPATIENT
Start: 2023-11-10 | End: 2023-11-10 | Stop reason: HOSPADM

## 2023-11-10 RX ORDER — SODIUM CHLORIDE 0.9 % (FLUSH) 0.9 %
3 SYRINGE (ML) INJECTION EVERY 12 HOURS SCHEDULED
Status: DISCONTINUED | OUTPATIENT
Start: 2023-11-10 | End: 2023-11-10 | Stop reason: HOSPADM

## 2023-11-10 RX ADMIN — PROPOFOL 100 MG: 10 INJECTION, EMULSION INTRAVENOUS at 12:27

## 2023-11-10 RX ADMIN — PROPOFOL 200 MCG/KG/MIN: 10 INJECTION, EMULSION INTRAVENOUS at 12:27

## 2023-11-10 RX ADMIN — SODIUM CHLORIDE, POTASSIUM CHLORIDE, SODIUM LACTATE AND CALCIUM CHLORIDE: 600; 310; 30; 20 INJECTION, SOLUTION INTRAVENOUS at 12:26

## 2023-11-10 RX ADMIN — SODIUM CHLORIDE, POTASSIUM CHLORIDE, SODIUM LACTATE AND CALCIUM CHLORIDE 30 ML/HR: 600; 310; 30; 20 INJECTION, SOLUTION INTRAVENOUS at 10:50

## 2023-11-10 RX ADMIN — PROPOFOL 150 MCG/KG/MIN: 10 INJECTION, EMULSION INTRAVENOUS at 12:54

## 2023-11-10 RX ADMIN — LIDOCAINE HYDROCHLORIDE 100 MG: 20 INJECTION, SOLUTION EPIDURAL; INFILTRATION; INTRACAUDAL; PERINEURAL at 12:27

## 2023-11-10 NOTE — H&P
Colonoscopy consultation        History of Present Illness  Davi Garcia is a 63 y.o. male presents to [facility] for colonoscopy consultation.     Patient presents today without complaints for screening colonoscopy.  He denies any abdominal pain, change in bowel habit, or rectal bleeding.  Denies any family history of colorectal cancer.  Admits to history of colonic polyps.     5/22: colonoscopy ([place]): rectum - hyperplastic; cecum - tubular adenoma; ascending - tubular adenoma; transverse- tubular adenoma; sigmoid- tubular adenoma.     10/16: colonoscopy ([place]): cecum - tubular adenoma.      7/13: colonoscopy ([place]): transverse- tubular adenoma; descending - 2 tubular adenomas           Objective   Medical History        Past Medical History:   Diagnosis Date    High blood pressure      High cholesterol      Kidney stones       HX of            Surgical History         Past Surgical History:   Procedure Laterality Date    COLONOSCOPY        COLONOSCOPY N/A 05/06/2022     Procedure: COLONOSCOPY with biopies and cold snares/ clip;  Surgeon: Jairo Copeland MD;  Location: [facility] ENDOSCOPY;  Service: General;  Laterality: N/A;  colon polyps     VASECTOMY                Medications Taking          Outpatient Medications Marked as Taking for the 6/27/23 encounter (Office Visit) with Deniz April, APRN   Medication Sig Dispense Refill    amLODIPine-benazepril (LOTREL 5-20) 5-20 MG per capsule Take 1 capsule by mouth Daily. 90 capsule 1    meloxicam (MOBIC) 15 MG tablet Take 1 tablet by mouth Daily. 90 tablet 1    Omega-3 Fatty Acids (fish oil) 1200 MG capsule capsule 1 capsule Every Night.                      Allergies   Allergen Reactions    Statins Other (See Comments)       Leg cramps    Zetia [Ezetimibe] Myalgia               Family History   Problem Relation Age of Onset    Urolithiasis Father      Malig Hyperthermia Neg Hx           Social History   Social History  "           Socioeconomic History    Marital status:    Tobacco Use    Smoking status: Never    Smokeless tobacco: Never   Vaping Use    Vaping Use: Never used   Substance and Sexual Activity    Alcohol use: Yes       Alcohol/week: 1.0 standard drink       Types: 1 Cans of beer per week       Comment: Current some day  drinks rarely; beer    Drug use: Never    Sexual activity: Yes       Partners: Female       Birth control/protection: Surgical       Comment: Vasectomy            Review of Systems   Constitutional:  Negative for chills and fever.   Gastrointestinal:  Negative for abdominal distention, abdominal pain, anal bleeding, blood in stool, constipation, diarrhea and rectal pain.       Vital Signs:   Pulse 53   Ht 185.4 cm (73\")   Wt 108 kg (239 lb)   BMI 31.53 kg/m²      Physical Exam  Vitals and nursing note reviewed.   Constitutional:       General: He is not in acute distress.     Appearance: Normal appearance.   HENT:      Head: Normocephalic.   Cardiovascular:      Rate and Rhythm: Normal rate.   Pulmonary:      Effort: Pulmonary effort is normal.      Breath sounds: No stridor.   Abdominal:      Palpations: Abdomen is soft.      Tenderness: There is no guarding.   Musculoskeletal:         General: No deformity. Normal range of motion.   Skin:     General: Skin is warm and dry.      Coloration: Skin is not jaundiced.   Neurological:      General: No focal deficit present.      Mental Status: He is alert and oriented to person, place, and time.   Psychiatric:         Mood and Affect: Mood normal.         Thought Content: Thought content normal.               Result Review   :            []  Laboratory  []  Radiology  []  Pathology  []  Microbiology  []  EKG/Telemetry   []  Cardiology/Vascular   []  Old records  I spent 15 minutes caring for rivas on this date of service. This time includes time spent by me in the following activities: reviewing tests, obtaining and/or reviewing a separately " obtained history, performing a medically appropriate examination and/or evaluation, ordering medications, tests, or procedures, and documenting information in the medical record.        Assessment   Assessment and Plan    Diagnoses and all orders for this visit:     1. Screening for malignant neoplasm of colon (Primary)     2. History of colonic polyps     Other orders  -     polyethylene glycol (MIRALAX) 17 g packet; Take as directed.  Instructions given in office.  Dispense: 238 g bottle  Dispense: 238 packet; Refill: 0           Follow Up   Return for Schedule colonoscopy with Dr. Copelnad on 11/10/2023 at [facility].     Hospital arrival time: 0900.     Possible risks/complications, benefits, and alternatives to surgical or invasive procedures have been explained to patient and/or legal guardian.     Patient has been evaluated and can tolerate anesthesia and/or sedation. Risks, benefits, and alternatives to anesthesia and sedation have been explained to the patient and/or legal guardian. Patient verbalizes understanding and is willing to proceed with the above plan.      Patient was given instructions and counseling regarding his condition or for health maintenance advice. Please see specific information pulled into the AVS if appropriate.

## 2023-11-10 NOTE — ANESTHESIA POSTPROCEDURE EVALUATION
Patient: Davi Garcia    Procedure Summary       Date: 11/10/23 Room / Location: [facility] ENDOSCOPY 1 / [facility] ENDOSCOPY    Anesthesia Start: 1226 Anesthesia Stop: 1311    Procedure: COLONOSCOPY with cold snare polypectomy Diagnosis:       Screening for malignant neoplasm of colon      History of colonic polyps      (Screening for malignant neoplasm of colon [Z12.11])      (History of colonic polyps [Z86.010])    Surgeons: Jairo Copeland MD Provider: Rhiannon Nixon CRNA    Anesthesia Type: general ASA Status: 2            Anesthesia Type: general    Vitals  Vitals Value Taken Time   /75 11/10/23 1324   Temp 36.2 °C (97.2 °F) 11/10/23 1324   Pulse 63 11/10/23 1324   Resp 16 11/10/23 1324   SpO2 98 % 11/10/23 1324           Post Anesthesia Care and Evaluation    Post-procedure mental status: acceptable.  Pain management: satisfactory to patient    Airway patency: patent  Anesthetic complications: No anesthetic complications    Cardiovascular status: acceptable  Respiratory status: acceptable    Comments: Per chart review

## 2023-11-29 ENCOUNTER — TELEPHONE (OUTPATIENT)
Dept: SURGERY | Facility: CLINIC | Age: 63
End: 2023-11-29
Payer: COMMERCIAL

## 2023-11-29 NOTE — TELEPHONE ENCOUNTER
PT RETURNED CALL AND BOOKED APPT TO HIS AVAILABILITY     Future Appointments         Provider Department Center    1/2/2024 8:45 AM Padmini Guaman APRN [facility] GENERAL SURGERY [facility]    5/6/2024 7:30 AM Carmela Willis APRN [facility] FAMILY MEDICINE [facility]

## 2023-11-29 NOTE — TELEPHONE ENCOUNTER
I lmom for Pt to call the office back in regards to his appointment on 12/1/2023 with YAMILET Echeverria. She will be out of the office and needs to reschedule.

## 2024-01-02 ENCOUNTER — OFFICE VISIT (OUTPATIENT)
Dept: SURGERY | Facility: CLINIC | Age: 64
End: 2024-01-02
Payer: COMMERCIAL

## 2024-01-02 ENCOUNTER — PREP FOR SURGERY (OUTPATIENT)
Dept: OTHER | Facility: HOSPITAL | Age: 64
End: 2024-01-02
Payer: COMMERCIAL

## 2024-01-02 VITALS
HEIGHT: 73 IN | WEIGHT: 242 LBS | SYSTOLIC BLOOD PRESSURE: 142 MMHG | BODY MASS INDEX: 32.07 KG/M2 | HEART RATE: 53 BPM | DIASTOLIC BLOOD PRESSURE: 86 MMHG

## 2024-01-02 DIAGNOSIS — K57.90 DIVERTICULOSIS: ICD-10-CM

## 2024-01-02 DIAGNOSIS — Z98.890 S/P COLONOSCOPY WITH POLYPECTOMY: ICD-10-CM

## 2024-01-02 DIAGNOSIS — D36.9 TUBULAR ADENOMA: ICD-10-CM

## 2024-01-02 DIAGNOSIS — D36.9 TUBULOVILLOUS ADENOMA: Primary | ICD-10-CM

## 2024-01-02 PROCEDURE — 99212 OFFICE O/P EST SF 10 MIN: CPT | Performed by: NURSE PRACTITIONER

## 2024-01-02 RX ORDER — POLYETHYLENE GLYCOL 3350 17 G/17G
POWDER, FOR SOLUTION ORAL
Qty: 238 PACKET | Refills: 0 | Status: SHIPPED | OUTPATIENT
Start: 2024-01-02

## 2024-01-02 RX ORDER — SODIUM CHLORIDE 0.9 % (FLUSH) 0.9 %
3 SYRINGE (ML) INJECTION EVERY 12 HOURS SCHEDULED
OUTPATIENT
Start: 2024-01-02

## 2024-01-02 RX ORDER — SODIUM CHLORIDE 0.9 % (FLUSH) 0.9 %
10 SYRINGE (ML) INJECTION AS NEEDED
OUTPATIENT
Start: 2024-01-02

## 2024-01-02 RX ORDER — SODIUM CHLORIDE 9 MG/ML
40 INJECTION, SOLUTION INTRAVENOUS AS NEEDED
OUTPATIENT
Start: 2024-01-02

## 2024-01-02 NOTE — PROGRESS NOTES
Chief Complaint: Post-op Follow-up    Subjective      Follow-up visit       History of Present Illness  Davi Garcia is a 63 y.o. male presents to [facility] for follow-up visit.    Patient presents today for follow-up visit after undergoing a colonoscopy on 11/10/2023 performed by Dr. Jairo Copeland.  Patient was with diverticulosis of the sigmoid;a tubulovillous adenoma of the hepatic flexure and a tubulovillous adenoma of the transverse colon; patient was also with a tubular adenoma of the descending colon per colonoscopy/pathology.  Resection and retrieval of all polyps were complete.    Results for orders placed or performed during the hospital encounter of 11/10/23   Tissue Pathology Exam    Specimen: A: Large Intestine, Hepatic Flexure; Tissue    B: Large Intestine, Transverse Colon; Tissue    C: Large Intestine, Left / Descending Colon; Tissue   Result Value Ref Range    Case Report       Surgical Pathology Report                         Case: [id number]                                  Authorizing Provider:  Jairo Copeland MD      Collected:           11/10/2023 12:54 PM          Ordering Location:     [facility] Received:            11/13/2023 06:55 AM                                 SUITES                                                                       Pathologist:           Julia Cope MD                                                     Specimens:   1) - Large Intestine, Hepatic Flexure, hepatic flexture cold snare polypectomy                      2) - Large Intestine, Transverse Colon, transverse colon polyp snare                                3) - Large Intestine, Left / Descending Colon, descending colon polyp cold snare           Clinical Information       Screening for malignant neoplasm of colon  History of colonic polyps      Final Diagnosis       1. Hepatic flexure colon polyp, biopsy:   - Fragments of tubulovillous  "adenoma      2. Transverse colon polyp, biopsy:   - Tubulovillous adenoma      3. Descending colon polyp, biopsy:   - Tubular adenoma          Gross Description       1. Large Intestine, Hepatic Flexure.  Received in formalin and labeled \" hepatic flexure cold snare polypectomy\" is a 0.7 cm aggregate of tan soft tissue fragments. The specimen is entirely submitted in one cassette.    2. Large Intestine, Transverse Colon.  Received in formalin and labeled \" transverse colon polyp snare\" is a 0.8 cm fragment of tan soft tissue. The specimen is entirely submitted in one cassette.    3. Large Intestine, Left / Descending Colon.  Received in formalin and labeled \" descending colon polyp cold snare\" is a 0.4 cm fragment of tan soft tissue. The specimen is entirely submitted in one cassette.   CHARY      Microscopic Description       Microscopic examination performed.       Colonoscopy was performed without difficulty.  The patient tolerated the procedure well.    Denies any postoperative complications.    Objective   Past Medical History:   Diagnosis Date    High blood pressure     High cholesterol     Kidney stones     HX of       Past Surgical History:   Procedure Laterality Date    COLONOSCOPY      COLONOSCOPY N/A 05/06/2022    Procedure: COLONOSCOPY with biopies and cold snares/ clip;  Surgeon: Jairo Copeland MD;  Location: [facility] ENDOSCOPY;  Service: General;  Laterality: N/A;  colon polyps     COLONOSCOPY N/A 11/10/2023    Procedure: COLONOSCOPY with cold snare polypectomy;  Surgeon: Jairo Copeland MD;  Location: [facility] ENDOSCOPY;  Service: General;  Laterality: N/A;  colon polyps    VASECTOMY         Outpatient Medications Marked as Taking for the 1/2/24 encounter (Office Visit) with Deniz April, APRN   Medication Sig Dispense Refill    amLODIPine-benazepril (LOTREL 5-20) 5-20 MG per capsule Take 1 capsule by mouth Daily. 90 capsule 1    meloxicam (MOBIC) 15 MG tablet TAKE 1 TABLET BY MOUTH EVERY DAY 90 " "tablet 1    Omega-3 Fatty Acids (fish oil) 1200 MG capsule capsule 1 capsule Every Night.         Allergies   Allergen Reactions    Statins Other (See Comments)     Leg cramps    Zetia [Ezetimibe] Myalgia        Family History   Problem Relation Age of Onset    Urolithiasis Father     Malpancho Hyperthermia Neg Hx        Social History     Socioeconomic History    Marital status:    Tobacco Use    Smoking status: Never    Smokeless tobacco: Never   Vaping Use    Vaping Use: Never used   Substance and Sexual Activity    Alcohol use: Yes     Alcohol/week: 1.0 standard drink of alcohol     Types: 1 Cans of beer per week     Comment: Current some day  drinks rarely; beer    Drug use: Never    Sexual activity: Yes     Partners: Female     Birth control/protection: Surgical     Comment: Vasectomy       Review of Systems   Constitutional:  Negative for chills and fever.   Gastrointestinal:  Negative for abdominal distention, abdominal pain, anal bleeding, blood in stool, constipation, diarrhea and rectal pain.        Vital Signs:   /86 (BP Location: Right arm)   Pulse 53   Ht 185.4 cm (73\")   Wt 110 kg (242 lb)   BMI 31.93 kg/m²      Physical Exam  Vitals and nursing note reviewed.   Constitutional:       General: He is not in acute distress.     Appearance: Normal appearance.   HENT:      Head: Normocephalic.   Cardiovascular:      Rate and Rhythm: Normal rate.   Pulmonary:      Effort: Pulmonary effort is normal.      Breath sounds: No stridor.   Abdominal:      Palpations: Abdomen is soft.      Tenderness: There is no guarding.   Musculoskeletal:         General: No deformity. Normal range of motion.   Skin:     General: Skin is warm and dry.      Coloration: Skin is not jaundiced.   Neurological:      General: No focal deficit present.      Mental Status: He is alert and oriented to person, place, and time.   Psychiatric:         Mood and Affect: Mood normal.         Thought Content: Thought content " normal.          Result Review :          []  Laboratory  []  Radiology  []  Pathology  []  Microbiology  []  EKG/Telemetry   []  Cardiology/Vascular   []  Old records  Today I reviewed Dr. Copeland's operative and pathology report.     Assessment and Plan    Diagnoses and all orders for this visit:    1. Tubulovillous adenoma (Primary)    2. Tubular adenoma    3. Diverticulosis    4. S/P colonoscopy with polypectomy        Follow Up   Return for Repeat colonoscopy on 5/23/2024 with Dr. Copeland at [facility].    Hospital arrival time: 12:00    Possible risks/complications, benefits, and alternatives to surgical or invasive procedures have been explained to patient and/or legal guardian.    Patient has been evaluated and can tolerate anesthesia and/or sedation. Risks, benefits, and alternatives to anesthesia and sedation have been explained to the patient and/or legal guardian. Patient verbalizes understanding and is willing to proceed with the above plan.     Patient was given instructions and counseling regarding his condition or for health maintenance advice. Please see specific information pulled into the AVS if appropriate.

## 2024-01-16 ENCOUNTER — TELEPHONE (OUTPATIENT)
Dept: FAMILY MEDICINE CLINIC | Facility: CLINIC | Age: 64
End: 2024-01-16
Payer: COMMERCIAL

## 2024-01-16 NOTE — TELEPHONE ENCOUNTER
Carmela Willis APRN Oakes, Kenna, RegSched Rep  Okay to close referral          Previous Messages       ----- Message -----  From: Martha Mora RegSched Rep  Sent: 12/29/2023   9:49 AM EST  To: YAMILET Page  Subject: no appts                                        No appts have been made can we close? I have reached out 6-7 times  The other office 3 times

## 2024-03-12 ENCOUNTER — TELEPHONE (OUTPATIENT)
Dept: SURGERY | Facility: CLINIC | Age: 64
End: 2024-03-12
Payer: COMMERCIAL

## 2024-03-12 NOTE — TELEPHONE ENCOUNTER
Caller: CONSUELO     Relationship to patient: SELF    Best call back number: [phone]    Patient is needing: PT NEEDS TO R/S COLONOSCOPY TO ANY TIME IN MAY EXCEPT THE WEEK IST IS SCHEDULED FOR PLEASE CALL PT TO R/S  PT HAD CONSULTATION WITH LAURIE WARD ALREADY BUT RECEIVED LTR STATING TO MAKE CONSULTATION APPT PLEASE ADVISE ON THIS AS WELL

## 2024-03-13 NOTE — TELEPHONE ENCOUNTER
SPOKE WITH THE PATIENT AND HE IS GOING TO TRY TO REARRANGE HIS SCHEDULE AND SEE IF HE CAN KEEP THE APPOINTMENT IN MAY, IF NOT HE WILL CALL US BACK TO R/S.

## 2024-03-14 NOTE — TELEPHONE ENCOUNTER
Pt is aware that he is r/s to 8/8/24 with a 12 pm arrival. Pt is going to change the date and time on his current bowel prep. Pt will call the office with any questions.

## 2024-03-14 NOTE — TELEPHONE ENCOUNTER
Patient called back to speak with April about if it's ok to wait until August to have his colonoscopy or if it has to be in May?

## 2024-05-06 ENCOUNTER — LAB (OUTPATIENT)
Dept: LAB | Facility: HOSPITAL | Age: 64
End: 2024-05-06
Payer: COMMERCIAL

## 2024-05-06 ENCOUNTER — OFFICE VISIT (OUTPATIENT)
Dept: FAMILY MEDICINE CLINIC | Facility: CLINIC | Age: 64
End: 2024-05-06
Payer: COMMERCIAL

## 2024-05-06 VITALS
HEIGHT: 73 IN | OXYGEN SATURATION: 96 % | SYSTOLIC BLOOD PRESSURE: 132 MMHG | BODY MASS INDEX: 32.18 KG/M2 | WEIGHT: 242.8 LBS | DIASTOLIC BLOOD PRESSURE: 72 MMHG | HEART RATE: 60 BPM | TEMPERATURE: 97.3 F

## 2024-05-06 DIAGNOSIS — Z12.5 PROSTATE CANCER SCREENING: ICD-10-CM

## 2024-05-06 DIAGNOSIS — E78.2 MIXED HYPERLIPIDEMIA: ICD-10-CM

## 2024-05-06 DIAGNOSIS — I10 ESSENTIAL HYPERTENSION: Primary | ICD-10-CM

## 2024-05-06 DIAGNOSIS — I10 ESSENTIAL HYPERTENSION: ICD-10-CM

## 2024-05-06 DIAGNOSIS — R73.09 ELEVATED GLUCOSE: ICD-10-CM

## 2024-05-06 LAB
ALBUMIN SERPL-MCNC: 4.2 G/DL (ref 3.5–5.2)
ALBUMIN/GLOB SERPL: 2 G/DL
ALP SERPL-CCNC: 89 U/L (ref 39–117)
ALT SERPL W P-5'-P-CCNC: 14 U/L (ref 1–41)
ANION GAP SERPL CALCULATED.3IONS-SCNC: 10.6 MMOL/L (ref 5–15)
AST SERPL-CCNC: 17 U/L (ref 1–40)
BASOPHILS # BLD AUTO: 0.04 10*3/MM3 (ref 0–0.2)
BASOPHILS NFR BLD AUTO: 0.9 % (ref 0–1.5)
BILIRUB SERPL-MCNC: 0.4 MG/DL (ref 0–1.2)
BUN SERPL-MCNC: 29 MG/DL (ref 8–23)
BUN/CREAT SERPL: 29.3 (ref 7–25)
CALCIUM SPEC-SCNC: 9 MG/DL (ref 8.6–10.5)
CHLORIDE SERPL-SCNC: 104 MMOL/L (ref 98–107)
CHOLEST SERPL-MCNC: 223 MG/DL (ref 0–200)
CO2 SERPL-SCNC: 23.4 MMOL/L (ref 22–29)
CREAT SERPL-MCNC: 0.99 MG/DL (ref 0.76–1.27)
DEPRECATED RDW RBC AUTO: 40.6 FL (ref 37–54)
EGFRCR SERPLBLD CKD-EPI 2021: 85.6 ML/MIN/1.73
EOSINOPHIL # BLD AUTO: 0.08 10*3/MM3 (ref 0–0.4)
EOSINOPHIL NFR BLD AUTO: 1.7 % (ref 0.3–6.2)
ERYTHROCYTE [DISTWIDTH] IN BLOOD BY AUTOMATED COUNT: 12.3 % (ref 12.3–15.4)
GLOBULIN UR ELPH-MCNC: 2.1 GM/DL
GLUCOSE SERPL-MCNC: 105 MG/DL (ref 65–99)
HCT VFR BLD AUTO: 45.5 % (ref 37.5–51)
HDLC SERPL-MCNC: 37 MG/DL (ref 40–60)
HGB BLD-MCNC: 15.2 G/DL (ref 13–17.7)
IMM GRANULOCYTES # BLD AUTO: 0.02 10*3/MM3 (ref 0–0.05)
IMM GRANULOCYTES NFR BLD AUTO: 0.4 % (ref 0–0.5)
LDLC SERPL CALC-MCNC: 109 MG/DL (ref 0–100)
LDLC/HDLC SERPL: 2.62 {RATIO}
LYMPHOCYTES # BLD AUTO: 1.12 10*3/MM3 (ref 0.7–3.1)
LYMPHOCYTES NFR BLD AUTO: 24 % (ref 19.6–45.3)
MCH RBC QN AUTO: 30.3 PG (ref 26.6–33)
MCHC RBC AUTO-ENTMCNC: 33.4 G/DL (ref 31.5–35.7)
MCV RBC AUTO: 90.6 FL (ref 79–97)
MONOCYTES # BLD AUTO: 0.39 10*3/MM3 (ref 0.1–0.9)
MONOCYTES NFR BLD AUTO: 8.4 % (ref 5–12)
NEUTROPHILS NFR BLD AUTO: 3.02 10*3/MM3 (ref 1.7–7)
NEUTROPHILS NFR BLD AUTO: 64.6 % (ref 42.7–76)
NRBC BLD AUTO-RTO: 0 /100 WBC (ref 0–0.2)
PLATELET # BLD AUTO: 180 10*3/MM3 (ref 140–450)
PMV BLD AUTO: 10.1 FL (ref 6–12)
POTASSIUM SERPL-SCNC: 4.1 MMOL/L (ref 3.5–5.2)
PROT SERPL-MCNC: 6.3 G/DL (ref 6–8.5)
PSA SERPL-MCNC: 6.72 NG/ML (ref 0–4)
RBC # BLD AUTO: 5.02 10*6/MM3 (ref 4.14–5.8)
SODIUM SERPL-SCNC: 138 MMOL/L (ref 136–145)
TRIGL SERPL-MCNC: 445 MG/DL (ref 0–150)
TSH SERPL DL<=0.05 MIU/L-ACNC: 1.3 UIU/ML (ref 0.27–4.2)
VLDLC SERPL-MCNC: 77 MG/DL (ref 5–40)
WBC NRBC COR # BLD AUTO: 4.67 10*3/MM3 (ref 3.4–10.8)

## 2024-05-06 PROCEDURE — 36415 COLL VENOUS BLD VENIPUNCTURE: CPT

## 2024-05-06 PROCEDURE — 80050 GENERAL HEALTH PANEL: CPT

## 2024-05-06 PROCEDURE — 83036 HEMOGLOBIN GLYCOSYLATED A1C: CPT

## 2024-05-06 PROCEDURE — 80061 LIPID PANEL: CPT

## 2024-05-06 PROCEDURE — 99214 OFFICE O/P EST MOD 30 MIN: CPT | Performed by: NURSE PRACTITIONER

## 2024-05-06 PROCEDURE — G0103 PSA SCREENING: HCPCS

## 2024-05-06 RX ORDER — AMLODIPINE BESYLATE AND BENAZEPRIL HYDROCHLORIDE 5; 20 MG/1; MG/1
1 CAPSULE ORAL DAILY
Qty: 90 CAPSULE | Refills: 1 | Status: SHIPPED | OUTPATIENT
Start: 2024-05-06

## 2024-05-07 DIAGNOSIS — R73.09 ELEVATED GLUCOSE: Primary | ICD-10-CM

## 2024-05-07 LAB — HBA1C MFR BLD: 5.7 % (ref 4.8–5.6)

## 2024-07-22 ENCOUNTER — TELEPHONE (OUTPATIENT)
Dept: FAMILY MEDICINE CLINIC | Facility: CLINIC | Age: 64
End: 2024-07-22
Payer: COMMERCIAL

## 2024-07-22 NOTE — TELEPHONE ENCOUNTER
HUB TO RELAY & SCHEDULE:      Called patient to reschedule appt due to Carmela Willis not seeing patients until 9 am. LM

## 2024-07-24 NOTE — TELEPHONE ENCOUNTER
HUB TO RELAY & SCHEDULE:      2ND ATTEMPT: Called patient to reschedule appt due to Carmela Willis not seeing patients until 9 am. LM

## 2024-07-25 NOTE — TELEPHONE ENCOUNTER
HUB TO RELAY & SCHEDULE:      3rD ATTEMPT: Called patient to reschedule appt due to Carmela Willis not seeing patients until 9 am. LM

## 2024-08-02 NOTE — PRE-PROCEDURE INSTRUCTIONS
Left message:  Reminded of arrival time at  1200  , Entrance C of the [facility] hospital. Instructed to bring or have a  over the age of 18 set up to drive you home the day of procedure.    Instructed on clear liquid diet the day before, nothing red or purple. Call with any questions about the prep or if in need of the prep.  Reminded them not to eat or drink anything am of procedure unless its a sip of water with medications.

## 2024-08-07 ENCOUNTER — ANESTHESIA EVENT (OUTPATIENT)
Dept: GASTROENTEROLOGY | Facility: HOSPITAL | Age: 64
End: 2024-08-07
Payer: COMMERCIAL

## 2024-08-07 NOTE — ANESTHESIA PREPROCEDURE EVALUATION
Anesthesia Evaluation     Patient summary reviewed and Nursing notes reviewed   NPO Solid Status: > 8 hours  NPO Liquid Status: > 4 hours           Airway   Mallampati: II  TM distance: >3 FB  Neck ROM: full  No difficulty expected  Dental - normal exam     Pulmonary - normal exam    breath sounds clear to auscultation  Cardiovascular - normal exam  Exercise tolerance: good (4-7 METS)    Rhythm: regular  Rate: normal    (+) hypertension well controlled, hyperlipidemia      Neuro/Psych  GI/Hepatic/Renal/Endo    (+) obesity, renal disease- stones    Musculoskeletal     Abdominal    Substance History      OB/GYN          Other   arthritis (hx of OA right knee),     ROS/Med Hx Other: No recent EKG    Hx polyps                   Anesthesia Plan    ASA 2     general   total IV anesthesia  (Total IV Anesthesia    Patient understands anesthesia not responsible for dental damage.  )  intravenous induction     Anesthetic plan, risks, benefits, and alternatives have been provided, discussed and informed consent has been obtained with: patient.  Pre-procedure education provided  Plan discussed with CRNA.      CODE STATUS:

## 2024-08-08 ENCOUNTER — HOSPITAL ENCOUNTER (OUTPATIENT)
Facility: HOSPITAL | Age: 64
Setting detail: HOSPITAL OUTPATIENT SURGERY
Discharge: HOME | End: 2024-08-08
Attending: SURGERY | Admitting: SURGERY
Payer: COMMERCIAL

## 2024-08-08 ENCOUNTER — ANESTHESIA (OUTPATIENT)
Dept: GASTROENTEROLOGY | Facility: HOSPITAL | Age: 64
End: 2024-08-08
Payer: COMMERCIAL

## 2024-08-08 VITALS
RESPIRATION RATE: 16 BRPM | BODY MASS INDEX: 30.48 KG/M2 | HEART RATE: 55 BPM | TEMPERATURE: 97.8 F | OXYGEN SATURATION: 96 % | DIASTOLIC BLOOD PRESSURE: 62 MMHG | WEIGHT: 231.04 LBS | SYSTOLIC BLOOD PRESSURE: 103 MMHG

## 2024-08-08 DIAGNOSIS — D36.9 TUBULOVILLOUS ADENOMA: ICD-10-CM

## 2024-08-08 PROCEDURE — 88305 TISSUE EXAM BY PATHOLOGIST: CPT | Performed by: SURGERY

## 2024-08-08 PROCEDURE — 25810000003 LACTATED RINGERS PER 1000 ML: Performed by: NURSE ANESTHETIST, CERTIFIED REGISTERED

## 2024-08-08 PROCEDURE — 25010000002 PROPOFOL 10 MG/ML EMULSION

## 2024-08-08 RX ORDER — LIDOCAINE HYDROCHLORIDE 20 MG/ML
INJECTION, SOLUTION EPIDURAL; INFILTRATION; INTRACAUDAL; PERINEURAL AS NEEDED
Status: DISCONTINUED | OUTPATIENT
Start: 2024-08-08 | End: 2024-08-08 | Stop reason: SURG

## 2024-08-08 RX ORDER — EPHEDRINE SULFATE 50 MG/ML
INJECTION INTRAVENOUS AS NEEDED
Status: DISCONTINUED | OUTPATIENT
Start: 2024-08-08 | End: 2024-08-08 | Stop reason: SURG

## 2024-08-08 RX ORDER — PROPOFOL 10 MG/ML
VIAL (ML) INTRAVENOUS AS NEEDED
Status: DISCONTINUED | OUTPATIENT
Start: 2024-08-08 | End: 2024-08-08 | Stop reason: SURG

## 2024-08-08 RX ORDER — SODIUM CHLORIDE, SODIUM LACTATE, POTASSIUM CHLORIDE, CALCIUM CHLORIDE 600; 310; 30; 20 MG/100ML; MG/100ML; MG/100ML; MG/100ML
30 INJECTION, SOLUTION INTRAVENOUS CONTINUOUS
Status: DISCONTINUED | OUTPATIENT
Start: 2024-08-08 | End: 2024-08-08 | Stop reason: HOSPADM

## 2024-08-08 RX ADMIN — PROPOFOL 200 MCG/KG/MIN: 10 INJECTION, EMULSION INTRAVENOUS at 13:39

## 2024-08-08 RX ADMIN — EPHEDRINE SULFATE 10 MG: 50 INJECTION INTRAVENOUS at 13:52

## 2024-08-08 RX ADMIN — PROPOFOL 90 MG: 10 INJECTION, EMULSION INTRAVENOUS at 13:38

## 2024-08-08 RX ADMIN — SODIUM CHLORIDE, POTASSIUM CHLORIDE, SODIUM LACTATE AND CALCIUM CHLORIDE: 600; 310; 30; 20 INJECTION, SOLUTION INTRAVENOUS at 13:35

## 2024-08-08 RX ADMIN — LIDOCAINE HYDROCHLORIDE 50 MG: 20 INJECTION, SOLUTION EPIDURAL; INFILTRATION; INTRACAUDAL; PERINEURAL at 13:38

## 2024-08-08 RX ADMIN — EPHEDRINE SULFATE 10 MG: 50 INJECTION INTRAVENOUS at 13:47

## 2024-08-08 NOTE — ANESTHESIA POSTPROCEDURE EVALUATION
Patient: Davi Garcia    Procedure Summary       Date: 08/08/24 Room / Location: [facility] ENDOSCOPY 1 / [facility] ENDOSCOPY    Anesthesia Start: 1335 Anesthesia Stop: 1408    Procedure: COLONOSCOPY W/ POLYPECTOMY Diagnosis:       Tubulovillous adenoma      (Tubulovillous adenoma [D36.9])    Surgeons: Jairo Copeland MD Provider: Estefanía Boland CRNA    Anesthesia Type: general ASA Status: 2            Anesthesia Type: general    Vitals  Vitals Value Taken Time   /62 08/08/24 1427   Temp 36.6 °C (97.8 °F) 08/08/24 1426   Pulse 53 08/08/24 1430   Resp 16 08/08/24 1426   SpO2 96 % 08/08/24 1430   Vitals shown include unfiled device data.        Post Anesthesia Care and Evaluation    Patient location during evaluation: bedside  Patient participation: complete - patient participated  Level of consciousness: awake  Pain management: adequate    Airway patency: patent  Anesthetic complications: No anesthetic complications  PONV Status: none  Cardiovascular status: acceptable and stable  Respiratory status: acceptable, spontaneous ventilation and room air  Hydration status: acceptable     Minocycline Pregnancy And Lactation Text: This medication is Pregnancy Category D and not consider safe during pregnancy. It is also excreted in breast milk. Azithromycin Pregnancy And Lactation Text: This medication is considered safe during pregnancy and is also secreted in breast milk. Benzoyl Peroxide Counseling: Patient counseled that medicine may cause skin irritation and bleach clothing.  In the event of skin irritation, the patient was advised to reduce the amount of the drug applied or use it less frequently.   The patient verbalized understanding of the proper use and possible adverse effects of benzoyl peroxide.  All of the patient's questions and concerns were addressed. Aklief counseling:  Patient advised to apply a pea-sized amount only at bedtime and wait 30 minutes after washing their face before applying.  If too drying, patient may add a non-comedogenic moisturizer.  The most commonly reported side effects including irritation, redness, scaling, dryness, stinging, burning, itching, and increased risk of sunburn.  The patient verbalized understanding of the proper use and possible adverse effects of retinoids.  All of the patient's questions and concerns were addressed. Doxycycline Counseling:  Patient counseled regarding possible photosensitivity and increased risk for sunburn.  Patient instructed to avoid sunlight, if possible.  When exposed to sunlight, patients should wear protective clothing, sunglasses, and sunscreen.  The patient was instructed to call the office immediately if the following severe adverse effects occur:  hearing changes, easy bruising/bleeding, severe headache, or vision changes.  The patient verbalized understanding of the proper use and possible adverse effects of doxycycline.  All of the patient's questions and concerns were addressed. Topical Sulfur Applications Pregnancy And Lactation Text: This medication is Pregnancy Category C and has an unknown safety profile during pregnancy. It is unknown if this topical medication is excreted in breast milk. Dapsone Counseling: I discussed with the patient the risks of dapsone including but not limited to hemolytic anemia, agranulocytosis, rashes, methemoglobinemia, kidney failure, peripheral neuropathy, headaches, GI upset, and liver toxicity.  Patients who start dapsone require monitoring including baseline LFTs and weekly CBCs for the first month, then every month thereafter.  The patient verbalized understanding of the proper use and possible adverse effects of dapsone.  All of the patient's questions and concerns were addressed. High Dose Vitamin A Pregnancy And Lactation Text: High dose vitamin A therapy is contraindicated during pregnancy and breast feeding. Tetracycline Counseling: Patient counseled regarding possible photosensitivity and increased risk for sunburn.  Patient instructed to avoid sunlight, if possible.  When exposed to sunlight, patients should wear protective clothing, sunglasses, and sunscreen.  The patient was instructed to call the office immediately if the following severe adverse effects occur:  hearing changes, easy bruising/bleeding, severe headache, or vision changes.  The patient verbalized understanding of the proper use and possible adverse effects of tetracycline.  All of the patient's questions and concerns were addressed. Patient understands to avoid pregnancy while on therapy due to potential birth defects. Include Pregnancy/Lactation Warning?: No Topical Retinoid counseling:  Patient advised to apply a pea-sized amount only at bedtime and wait 30 minutes after washing their face before applying.  If too drying, patient may add a non-comedogenic moisturizer. The patient verbalized understanding of the proper use and possible adverse effects of retinoids.  All of the patient's questions and concerns were addressed. Isotretinoin Pregnancy And Lactation Text: This medication is Pregnancy Category X and is considered extremely dangerous during pregnancy. It is unknown if it is excreted in breast milk. Tazorac Counseling:  Patient advised that medication is irritating and drying.  Patient may need to apply sparingly and wash off after an hour before eventually leaving it on overnight.  The patient verbalized understanding of the proper use and possible adverse effects of tazorac.  All of the patient's questions and concerns were addressed. Winlevi Pregnancy And Lactation Text: This medication is considered safe during pregnancy and breastfeeding. Spironolactone Counseling: Patient advised regarding risks of diarrhea, abdominal pain, hyperkalemia, birth defects (for female patients), liver toxicity and renal toxicity. The patient may need blood work to monitor liver and kidney function and potassium levels while on therapy. The patient verbalized understanding of the proper use and possible adverse effects of spironolactone.  All of the patient's questions and concerns were addressed. Birth Control Pills Counseling: Birth Control Pill Counseling: I discussed with the patient the potential side effects of OCPs including but not limited to increased risk of stroke, heart attack, thrombophlebitis, deep venous thrombosis, hepatic adenomas, breast changes, GI upset, headaches, and depression.  The patient verbalized understanding of the proper use and possible adverse effects of OCPs. All of the patient's questions and concerns were addressed. Bactrim Counseling:  I discussed with the patient the risks of sulfa antibiotics including but not limited to GI upset, allergic reaction, drug rash, diarrhea, dizziness, photosensitivity, and yeast infections.  Rarely, more serious reactions can occur including but not limited to aplastic anemia, agranulocytosis, methemoglobinemia, blood dyscrasias, liver or kidney failure, lung infiltrates or desquamative/blistering drug rashes. Azelaic Acid Pregnancy And Lactation Text: This medication is considered safe during pregnancy and breast feeding. Erythromycin Pregnancy And Lactation Text: This medication is Pregnancy Category B and is considered safe during pregnancy. It is also excreted in breast milk. Sarecycline Counseling: Patient advised regarding possible photosensitivity and discoloration of the teeth, skin, lips, tongue and gums.  Patient instructed to avoid sunlight, if possible.  When exposed to sunlight, patients should wear protective clothing, sunglasses, and sunscreen.  The patient was instructed to call the office immediately if the following severe adverse effects occur:  hearing changes, easy bruising/bleeding, severe headache, or vision changes.  The patient verbalized understanding of the proper use and possible adverse effects of sarecycline.  All of the patient's questions and concerns were addressed. Topical Clindamycin Counseling: Patient counseled that this medication may cause skin irritation or allergic reactions.  In the event of skin irritation, the patient was advised to reduce the amount of the drug applied or use it less frequently.   The patient verbalized understanding of the proper use and possible adverse effects of clindamycin.  All of the patient's questions and concerns were addressed. Doxycycline Pregnancy And Lactation Text: This medication is Pregnancy Category D and not consider safe during pregnancy. It is also excreted in breast milk but is considered safe for shorter treatment courses. Aklief Pregnancy And Lactation Text: It is unknown if this medication is safe to use during pregnancy.  It is unknown if this medication is excreted in breast milk.  Breastfeeding women should use the topical cream on the smallest area of the skin for the shortest time needed while breastfeeding.  Do not apply to nipple and areola. Topical Sulfur Applications Counseling: Topical Sulfur Counseling: Patient counseled that this medication may cause skin irritation or allergic reactions.  In the event of skin irritation, the patient was advised to reduce the amount of the drug applied or use it less frequently.   The patient verbalized understanding of the proper use and possible adverse effects of topical sulfur application.  All of the patient's questions and concerns were addressed. Minocycline Counseling: Patient advised regarding possible photosensitivity and discoloration of the teeth, skin, lips, tongue and gums.  Patient instructed to avoid sunlight, if possible.  When exposed to sunlight, patients should wear protective clothing, sunglasses, and sunscreen.  The patient was instructed to call the office immediately if the following severe adverse effects occur:  hearing changes, easy bruising/bleeding, severe headache, or vision changes.  The patient verbalized understanding of the proper use and possible adverse effects of minocycline.  All of the patient's questions and concerns were addressed. Azithromycin Counseling:  I discussed with the patient the risks of azithromycin including but not limited to GI upset, allergic reaction, drug rash, diarrhea, and yeast infections. Benzoyl Peroxide Pregnancy And Lactation Text: This medication is Pregnancy Category C. It is unknown if benzoyl peroxide is excreted in breast milk. Dapsone Pregnancy And Lactation Text: This medication is Pregnancy Category C and is not considered safe during pregnancy or breast feeding. High Dose Vitamin A Counseling: Side effects reviewed, pt to contact office should one occur. Winlevi Counseling:  I discussed with the patient the risks of topical clascoterone including but not limited to erythema, scaling, itching, and stinging. Patient voiced their understanding. Topical Retinoid Pregnancy And Lactation Text: This medication is Pregnancy Category C. It is unknown if this medication is excreted in breast milk. Birth Control Pills Pregnancy And Lactation Text: This medication should be avoided if pregnant and for the first 30 days post-partum. Spironolactone Pregnancy And Lactation Text: This medication can cause feminization of the male fetus and should be avoided during pregnancy. The active metabolite is also found in breast milk. Isotretinoin Counseling: Patient should get monthly blood tests, not donate blood, not drive at night if vision affected, not share medication, and not undergo elective surgery for 6 months after tx completed. Side effects reviewed, pt to contact office should one occur. Detail Level: Zone Tazorac Pregnancy And Lactation Text: This medication is not safe during pregnancy. It is unknown if this medication is excreted in breast milk. Erythromycin Counseling:  I discussed with the patient the risks of erythromycin including but not limited to GI upset, allergic reaction, drug rash, diarrhea, increase in liver enzymes, and yeast infections. Azelaic Acid Counseling: Patient counseled that medicine may cause skin irritation and to avoid applying near the eyes.  In the event of skin irritation, the patient was advised to reduce the amount of the drug applied or use it less frequently.   The patient verbalized understanding of the proper use and possible adverse effects of azelaic acid.  All of the patient's questions and concerns were addressed. Topical Clindamycin Pregnancy And Lactation Text: This medication is Pregnancy Category B and is considered safe during pregnancy. It is unknown if it is excreted in breast milk. Bactrim Pregnancy And Lactation Text: This medication is Pregnancy Category D and is known to cause fetal risk.  It is also excreted in breast milk.

## 2024-08-08 NOTE — H&P
Colonoscopy consultation        History of Present Illness  Davi Garcia is a 63 y.o. male presents to [facility] for colonoscopy consultation.     Patient presents today without complaints for screening colonoscopy.  He denies any abdominal pain, change in bowel habit, or rectal bleeding.  Denies any family history of colorectal cancer.  Admits to history of colonic polyps.     5/22: colonoscopy ([place]): rectum - hyperplastic; cecum - tubular adenoma; ascending - tubular adenoma; transverse- tubular adenoma; sigmoid- tubular adenoma.     10/16: [place]oscopy ([facility]): cecum - tubular adenoma.      7/13: [place] ([facility]): transverse- tubular adenoma; descending - 2 tubular adenomas           Objective   Medical History           Past Medical History:   Diagnosis Date    High blood pressure      High cholesterol      Kidney stones       HX of            Surgical History             Past Surgical History:   Procedure Laterality Date    COLONOSCOPY        COLONOSCOPY N/A 05/06/2022     Procedure: COLONOSCOPY with biopies and cold snares/ clip;  Surgeon: Jairo Copeland MD;  Location: [facility] ENDOSCOPY;  Service: General;  Laterality: N/A;  colon polyps     VASECTOMY                Medications Taking               Outpatient Medications Marked as Taking for the 6/27/23 encounter (Office Visit) with Deniz April, APRN   Medication Sig Dispense Refill    amLODIPine-benazepril (LOTREL 5-20) 5-20 MG per capsule Take 1 capsule by mouth Daily. 90 capsule 1    meloxicam (MOBIC) 15 MG tablet Take 1 tablet by mouth Daily. 90 tablet 1    Omega-3 Fatty Acids (fish oil) 1200 MG capsule capsule 1 capsule Every Night.                          Allergies   Allergen Reactions    Statins Other (See Comments)       Leg cramps    Zetia [Ezetimibe] Myalgia                   Family History   Problem Relation Age of Onset    Urolithiasis Father      Malig Hyperthermia Neg Hx           Social  "History   Social History                Socioeconomic History    Marital status:    Tobacco Use    Smoking status: Never    Smokeless tobacco: Never   Vaping Use    Vaping Use: Never used   Substance and Sexual Activity    Alcohol use: Yes       Alcohol/week: 1.0 standard drink       Types: 1 Cans of beer per week       Comment: Current some day  drinks rarely; beer    Drug use: Never    Sexual activity: Yes       Partners: Female       Birth control/protection: Surgical       Comment: Vasectomy            Review of Systems   Constitutional:  Negative for chills and fever.   Gastrointestinal:  Negative for abdominal distention, abdominal pain, anal bleeding, blood in stool, constipation, diarrhea and rectal pain.       Vital Signs:   Pulse 53   Ht 185.4 cm (73\")   Wt 108 kg (239 lb)   BMI 31.53 kg/m²      Physical Exam  Vitals and nursing note reviewed.   Constitutional:       General: He is not in acute distress.     Appearance: Normal appearance.   HENT:      Head: Normocephalic.   Cardiovascular:      Rate and Rhythm: Normal rate.   Pulmonary:      Effort: Pulmonary effort is normal.      Breath sounds: No stridor.   Abdominal:      Palpations: Abdomen is soft.      Tenderness: There is no guarding.   Musculoskeletal:         General: No deformity. Normal range of motion.   Skin:     General: Skin is warm and dry.      Coloration: Skin is not jaundiced.   Neurological:      General: No focal deficit present.      Mental Status: He is alert and oriented to person, place, and time.   Psychiatric:         Mood and Affect: Mood normal.         Thought Content: Thought content normal.               Result Review   :            []  Laboratory  []  Radiology  []  Pathology  []  Microbiology  []  EKG/Telemetry   []  Cardiology/Vascular   []  Old records  I spent 15 minutes caring for rivas on this date of service. This time includes time spent by me in the following activities: reviewing tests, obtaining " and/or reviewing a separately obtained history, performing a medically appropriate examination and/or evaluation, ordering medications, tests, or procedures, and documenting information in the medical record.        Assessment   Assessment and Plan    Diagnoses and all orders for this visit:     1. Screening for malignant neoplasm of colon (Primary)     2. History of colonic polyps     Other orders  -     polyethylene glycol (MIRALAX) 17 g packet; Take as directed.  Instructions given in office.  Dispense: 238 g bottle  Dispense: 238 packet; Refill: 0           Follow Up   Return for Schedule colonoscopy with Dr. Copeland on 11/10/2023 at [facility].     Hospital arrival time: 0900.     Possible risks/complications, benefits, and alternatives to surgical or invasive procedures have been explained to patient and/or legal guardian.     Patient has been evaluated and can tolerate anesthesia and/or sedation. Risks, benefits, and alternatives to anesthesia and sedation have been explained to the patient and/or legal guardian. Patient verbalizes understanding and is willing to proceed with the above plan.      Patient was given instructions and counseling regarding his condition or for health maintenance advice. Please see specific information pulled into the AVS if appropriate.

## 2024-08-19 ENCOUNTER — OFFICE VISIT (OUTPATIENT)
Dept: SURGERY | Facility: CLINIC | Age: 64
End: 2024-08-19
Payer: COMMERCIAL

## 2024-08-19 VITALS
WEIGHT: 241 LBS | SYSTOLIC BLOOD PRESSURE: 140 MMHG | BODY MASS INDEX: 31.94 KG/M2 | HEART RATE: 58 BPM | HEIGHT: 73 IN | DIASTOLIC BLOOD PRESSURE: 77 MMHG

## 2024-08-19 DIAGNOSIS — D12.6 TUBULAR ADENOMA OF COLON: ICD-10-CM

## 2024-08-19 DIAGNOSIS — Z98.890 S/P COLONOSCOPY WITH POLYPECTOMY: Primary | ICD-10-CM

## 2024-08-19 PROCEDURE — 99212 OFFICE O/P EST SF 10 MIN: CPT | Performed by: NURSE PRACTITIONER

## 2024-08-19 NOTE — PROGRESS NOTES
"Chief Complaint: Follow-up    Subjective      Follow-up visit       History of Present Illness  Davi Garcia is a 64 y.o. male presents to [facility] SUR[facility]ERY for follow-up visit.    Patient presents today for follow-up visit after undergoing a colonoscopy on 8/8/2024 performed by Dr. Jairo Copeland.  Patient was with 2 tubular adenomas of his cecum and 2 tubular adenoma of his rectum per colonoscopy/pathology.  Resection and retrieval were complete.    Results for orders placed or performed during the hospital encounter of 08/08/24   Tissue Pathology Exam    Specimen: A: Large Intestine, Cecum; Polyp    B: Large Intestine, Rectum; Polyp   Result Value Ref Range    Case Report       Surgical Pathology Report                         Case: [id number]                                  Authorizing Provider:  Jairo Copeland MD      Collected:           08/08/2024 01:50 PM          Ordering Location:     [facility] Received:            08/09/2024 07:51 AM                                 SUITES                                                                       Pathologist:           Domingo Johansen MD                                                            Specimens:   1) - Large Intestine, Cecum, cecal polyp                                                            2) - Large Intestine, Rectum, RECTUM POLYP                                                 Clinical Information       Tubulovillous adenoma      Final Diagnosis       1.  Cecum polyp, biopsy:   -Tubular adenoma      2.  Rectum polyp, biopsy:   -Tubular adenoma      Gross Description       1. Large Intestine, Cecum.  Received in formalin and labeled \" cecal polyp\" is a 0.4 cm fragment of tan soft tissue. The specimen is entirely submitted in one cassette.    2. Large Intestine, Rectum.  Received in formalin and labeled \" rectum polyp\" are three fragments of tan soft tissue measuring 0.2-0.3 cm in greatest " dimension. The specimen is entirely submitted in one cassette.   CHARY      Microscopic Description       Microscopic examination performed.       Colonoscopy was performed without difficulty.  The patient tolerated the procedure well.    Patient denies any postoperative complications.  Objective     Past Medical History:   Diagnosis Date    Colon polyp     High blood pressure     High cholesterol     Kidney stones     HX of       Past Surgical History:   Procedure Laterality Date    COLONOSCOPY      COLONOSCOPY N/A 05/06/2022    Procedure: COLONOSCOPY with biopies and cold snares/ clip;  Surgeon: Jairo Copeland MD;  Location: [facility] ENDOSCOPY;  Service: General;  Laterality: N/A;  colon polyps     COLONOSCOPY N/A 11/10/2023    Procedure: COLONOSCOPY with cold snare polypectomy;  Surgeon: Jairo Copeland MD;  Location: [facility] ENDOSCOPY;  Service: General;  Laterality: N/A;  colon polyps    COLONOSCOPY N/A 08/08/2024    Procedure: COLONOSCOPY W/ POLYPECTOMY;  Surgeon: Jairo Copeland MD;  Location: [facility] ENDOSCOPY;  Service: General;  Laterality: N/A;  COLON POLYP, HEMORRHOIDS    VASECTOMY         Outpatient Medications Marked as Taking for the 8/19/24 encounter (Office Visit) with Deniz April, APRN   Medication Sig Dispense Refill    amLODIPine-benazepril (LOTREL 5-20) 5-20 MG per capsule Take 1 capsule by mouth Daily. 90 capsule 1    meloxicam (MOBIC) 15 MG tablet TAKE 1 TABLET BY MOUTH EVERY DAY 90 tablet 1    Omega-3 Fatty Acids (fish oil) 1200 MG capsule capsule 1 capsule Every Night.         Allergies   Allergen Reactions    Statins Other (See Comments)     Leg cramps    Zetia [Ezetimibe] Myalgia        Family History   Problem Relation Age of Onset    Urolithiasis Father     Malig Hyperthermia Neg Hx        Social History     Socioeconomic History    Marital status:    Tobacco Use    Smoking status: Never    Smokeless tobacco: Never   Vaping Use    Vaping status: Never Used   Substance and  "Sexual Activity    Alcohol use: Yes     Alcohol/week: 1.0 standard drink of alcohol     Types: 1 Cans of beer per week     Comment: Current some day  drinks rarely; beer    Drug use: Never    Sexual activity: Yes     Partners: Female     Birth control/protection: Vasectomy     Comment: Vasectomy       Review of Systems   Constitutional:  Negative for chills and fever.   Gastrointestinal:  Negative for abdominal distention, abdominal pain, anal bleeding, blood in stool, constipation, diarrhea and rectal pain.        Vital Signs:   /77 (BP Location: Left arm, Patient Position: Sitting, Cuff Size: Adult)   Pulse 58   Ht 185.4 cm (73\")   Wt 109 kg (241 lb)   BMI 31.80 kg/m²      Physical Exam  Vitals and nursing note reviewed.   Constitutional:       General: He is not in acute distress.     Appearance: Normal appearance. He is not ill-appearing.   HENT:      Head: Normocephalic and atraumatic.   Cardiovascular:      Rate and Rhythm: Normal rate.   Pulmonary:      Effort: Pulmonary effort is normal.      Breath sounds: No stridor.   Abdominal:      Palpations: Abdomen is soft.      Tenderness: There is no guarding.   Musculoskeletal:         General: No deformity. Normal range of motion.   Skin:     General: Skin is warm and dry.      Coloration: Skin is not jaundiced.   Neurological:      General: No focal deficit present.      Mental Status: He is alert and oriented to person, place, and time.   Psychiatric:         Mood and Affect: Mood normal.         Thought Content: Thought content normal.          Result Review :          []  Laboratory  []  Radiology  []  Pathology  []  Microbiology  []  EKG/Telemetry   []  Cardiology/Vascular   []  Old records  Today I reviewed Dr. Copeland's operative pathology report.     Assessment and Plan    Diagnoses and all orders for this visit:    1. S/P colonoscopy with polypectomy (Primary)    2. Tubular adenoma of colon        Follow Up   Return for Rescreen colon in 1 " year; follow-up in the interim as needed.    Avoid high fat diets    Increase fiber intake    Per AGA guidelines patient will follow-up for colonoscopy surveillance as directed.    Patient was given instructions and counseling regarding his condition or for health maintenance advice. Please see specific information pulled into the AVS if appropriate.

## 2024-09-16 ENCOUNTER — OFFICE VISIT (OUTPATIENT)
Dept: FAMILY MEDICINE CLINIC | Facility: CLINIC | Age: 64
End: 2024-09-16
Payer: COMMERCIAL

## 2024-09-16 ENCOUNTER — HOSPITAL ENCOUNTER (OUTPATIENT)
Dept: GENERAL RADIOLOGY | Facility: HOSPITAL | Age: 64
Discharge: HOME | End: 2024-09-16
Admitting: NURSE PRACTITIONER
Payer: COMMERCIAL

## 2024-09-16 VITALS
DIASTOLIC BLOOD PRESSURE: 74 MMHG | BODY MASS INDEX: 31.94 KG/M2 | SYSTOLIC BLOOD PRESSURE: 131 MMHG | TEMPERATURE: 97.9 F | OXYGEN SATURATION: 97 % | HEART RATE: 60 BPM | HEIGHT: 73 IN | WEIGHT: 241 LBS

## 2024-09-16 DIAGNOSIS — M54.50 ACUTE LOW BACK PAIN WITHOUT SCIATICA, UNSPECIFIED BACK PAIN LATERALITY: ICD-10-CM

## 2024-09-16 DIAGNOSIS — R10.9 FLANK PAIN: ICD-10-CM

## 2024-09-16 DIAGNOSIS — R31.9 HEMATURIA, UNSPECIFIED TYPE: ICD-10-CM

## 2024-09-16 DIAGNOSIS — M54.50 ACUTE LOW BACK PAIN WITHOUT SCIATICA, UNSPECIFIED BACK PAIN LATERALITY: Primary | ICD-10-CM

## 2024-09-16 LAB
BILIRUB BLD-MCNC: NEGATIVE MG/DL
CLARITY, POC: CLEAR
COLOR UR: YELLOW
EXPIRATION DATE: ABNORMAL
GLUCOSE UR STRIP-MCNC: NEGATIVE MG/DL
KETONES UR QL: NEGATIVE
LEUKOCYTE EST, POC: NEGATIVE
Lab: ABNORMAL
NITRITE UR-MCNC: NEGATIVE MG/ML
PH UR: 5.5 [PH] (ref 5–8)
PROT UR STRIP-MCNC: NEGATIVE MG/DL
RBC # UR STRIP: ABNORMAL /UL
SP GR UR: 1.03 (ref 1–1.03)
UROBILINOGEN UR QL: NORMAL

## 2024-09-16 PROCEDURE — 74018 RADEX ABDOMEN 1 VIEW: CPT

## 2024-09-16 PROCEDURE — 81003 URINALYSIS AUTO W/O SCOPE: CPT | Performed by: NURSE PRACTITIONER

## 2024-09-16 PROCEDURE — 99213 OFFICE O/P EST LOW 20 MIN: CPT | Performed by: NURSE PRACTITIONER

## 2024-09-17 DIAGNOSIS — R10.9 FLANK PAIN: Primary | ICD-10-CM

## 2024-09-17 DIAGNOSIS — R10.31 RIGHT LOWER QUADRANT ABDOMINAL PAIN: ICD-10-CM

## 2024-11-12 ENCOUNTER — LAB (OUTPATIENT)
Dept: LAB | Facility: HOSPITAL | Age: 64
End: 2024-11-12
Payer: COMMERCIAL

## 2024-11-12 ENCOUNTER — OFFICE VISIT (OUTPATIENT)
Dept: FAMILY MEDICINE CLINIC | Facility: CLINIC | Age: 64
End: 2024-11-12
Payer: COMMERCIAL

## 2024-11-12 VITALS
WEIGHT: 239 LBS | BODY MASS INDEX: 31.68 KG/M2 | TEMPERATURE: 97.7 F | HEART RATE: 57 BPM | OXYGEN SATURATION: 98 % | HEIGHT: 73 IN | SYSTOLIC BLOOD PRESSURE: 116 MMHG | DIASTOLIC BLOOD PRESSURE: 72 MMHG

## 2024-11-12 DIAGNOSIS — Z12.11 COLON CANCER SCREENING: ICD-10-CM

## 2024-11-12 DIAGNOSIS — I10 ESSENTIAL HYPERTENSION: ICD-10-CM

## 2024-11-12 DIAGNOSIS — Z00.00 ANNUAL PHYSICAL EXAM: ICD-10-CM

## 2024-11-12 DIAGNOSIS — R97.20 ELEVATED PSA MEASUREMENT: ICD-10-CM

## 2024-11-12 DIAGNOSIS — Z00.00 ANNUAL PHYSICAL EXAM: Primary | ICD-10-CM

## 2024-11-12 LAB
ALBUMIN SERPL-MCNC: 4.6 G/DL (ref 3.5–5.2)
ALBUMIN/GLOB SERPL: 1.9 G/DL
ALP SERPL-CCNC: 75 U/L (ref 39–117)
ALT SERPL W P-5'-P-CCNC: 16 U/L (ref 1–41)
ANION GAP SERPL CALCULATED.3IONS-SCNC: 7.8 MMOL/L (ref 5–15)
AST SERPL-CCNC: 20 U/L (ref 1–40)
BASOPHILS # BLD AUTO: 0.05 10*3/MM3 (ref 0–0.2)
BASOPHILS NFR BLD AUTO: 1 % (ref 0–1.5)
BILIRUB SERPL-MCNC: 0.6 MG/DL (ref 0–1.2)
BUN SERPL-MCNC: 25 MG/DL (ref 8–23)
BUN/CREAT SERPL: 22.9 (ref 7–25)
CALCIUM SPEC-SCNC: 10 MG/DL (ref 8.6–10.5)
CHLORIDE SERPL-SCNC: 104 MMOL/L (ref 98–107)
CHOLEST SERPL-MCNC: 251 MG/DL (ref 0–200)
CO2 SERPL-SCNC: 25.2 MMOL/L (ref 22–29)
CREAT SERPL-MCNC: 1.09 MG/DL (ref 0.76–1.27)
DEPRECATED RDW RBC AUTO: 39.2 FL (ref 37–54)
EGFRCR SERPLBLD CKD-EPI 2021: 76.3 ML/MIN/1.73
EOSINOPHIL # BLD AUTO: 0.05 10*3/MM3 (ref 0–0.4)
EOSINOPHIL NFR BLD AUTO: 1 % (ref 0.3–6.2)
ERYTHROCYTE [DISTWIDTH] IN BLOOD BY AUTOMATED COUNT: 12 % (ref 12.3–15.4)
GLOBULIN UR ELPH-MCNC: 2.4 GM/DL
GLUCOSE SERPL-MCNC: 93 MG/DL (ref 65–99)
HCT VFR BLD AUTO: 46.7 % (ref 37.5–51)
HDLC SERPL-MCNC: 45 MG/DL (ref 40–60)
HGB BLD-MCNC: 16.2 G/DL (ref 13–17.7)
IMM GRANULOCYTES # BLD AUTO: 0.01 10*3/MM3 (ref 0–0.05)
IMM GRANULOCYTES NFR BLD AUTO: 0.2 % (ref 0–0.5)
LDLC SERPL CALC-MCNC: 184 MG/DL (ref 0–100)
LDLC/HDLC SERPL: 4.03 {RATIO}
LYMPHOCYTES # BLD AUTO: 1.24 10*3/MM3 (ref 0.7–3.1)
LYMPHOCYTES NFR BLD AUTO: 24.9 % (ref 19.6–45.3)
MCH RBC QN AUTO: 31.2 PG (ref 26.6–33)
MCHC RBC AUTO-ENTMCNC: 34.7 G/DL (ref 31.5–35.7)
MCV RBC AUTO: 89.8 FL (ref 79–97)
MONOCYTES # BLD AUTO: 0.34 10*3/MM3 (ref 0.1–0.9)
MONOCYTES NFR BLD AUTO: 6.8 % (ref 5–12)
NEUTROPHILS NFR BLD AUTO: 3.29 10*3/MM3 (ref 1.7–7)
NEUTROPHILS NFR BLD AUTO: 66.1 % (ref 42.7–76)
NRBC BLD AUTO-RTO: 0 /100 WBC (ref 0–0.2)
PLATELET # BLD AUTO: 209 10*3/MM3 (ref 140–450)
PMV BLD AUTO: 10.4 FL (ref 6–12)
POTASSIUM SERPL-SCNC: 5.7 MMOL/L (ref 3.5–5.2)
PROT SERPL-MCNC: 7 G/DL (ref 6–8.5)
PSA SERPL-MCNC: 6.78 NG/ML (ref 0–4)
RBC # BLD AUTO: 5.2 10*6/MM3 (ref 4.14–5.8)
SODIUM SERPL-SCNC: 137 MMOL/L (ref 136–145)
TRIGL SERPL-MCNC: 123 MG/DL (ref 0–150)
TSH SERPL DL<=0.05 MIU/L-ACNC: 1.2 UIU/ML (ref 0.27–4.2)
VLDLC SERPL-MCNC: 22 MG/DL (ref 5–40)
WBC NRBC COR # BLD AUTO: 4.98 10*3/MM3 (ref 3.4–10.8)

## 2024-11-12 PROCEDURE — 99396 PREV VISIT EST AGE 40-64: CPT | Performed by: NURSE PRACTITIONER

## 2024-11-12 PROCEDURE — 84153 ASSAY OF PSA TOTAL: CPT

## 2024-11-12 PROCEDURE — 80061 LIPID PANEL: CPT

## 2024-11-12 PROCEDURE — 36415 COLL VENOUS BLD VENIPUNCTURE: CPT

## 2024-11-12 PROCEDURE — 80050 GENERAL HEALTH PANEL: CPT

## 2024-11-12 RX ORDER — AMLODIPINE AND BENAZEPRIL HYDROCHLORIDE 5; 20 MG/1; MG/1
1 CAPSULE ORAL DAILY
Qty: 90 CAPSULE | Refills: 1 | Status: SHIPPED | OUTPATIENT
Start: 2024-11-12

## 2024-11-12 NOTE — PROGRESS NOTES
Chief Complaint    Annual physical exam with lab  Follow-up (6 months), Hypertension, and Hyperlipidemia    SUBJECTIVE  Davi Garcia presents to [facility]      Annual physical exam with lab    Hypertension:  Patient is taking Amlodipine-Benazepril.  Patient's Blood Pressure in clinic today is 116/72.  Patient does not monitor blood pressure at home.  Patient denies chest pain, shortness of air, headache, flushing, abnormal swelling in feet/ankles.    Hyperlipidemia:  Patient is taking Fish Oil.  Patient denies nocturnal leg cramps, myalgias.  Patient attempts to maintain a diet low in fat and carbohydrates.    Chronic Knee Pain:  Patient is taking Meloxicam only as needed. Pt is trying to use stretching, heat and ice.     History of Present Illness  Past Medical History:   Diagnosis Date    Colon polyp     High blood pressure     High cholesterol     Kidney stones     HX of      Family History   Problem Relation Age of Onset    Urolithiasis Father     Malig Hyperthermia Neg Hx       Past Surgical History:   Procedure Laterality Date    COLONOSCOPY      COLONOSCOPY N/A 05/06/2022    Procedure: COLONOSCOPY with biopies and cold snares/ clip;  Surgeon: Jairo Copeland MD;  Location: [facility] ENDOSCOPY;  Service: General;  Laterality: N/A;  colon polyps     COLONOSCOPY N/A 11/10/2023    Procedure: COLONOSCOPY with cold snare polypectomy;  Surgeon: Jairo Copeland MD;  Location: [facility] ENDOSCOPY;  Service: General;  Laterality: N/A;  colon polyps    COLONOSCOPY N/A 08/08/2024    Procedure: COLONOSCOPY W/ POLYPECTOMY;  Surgeon: Jairo Copeland MD;  Location: [facility] ENDOSCOPY;  Service: General;  Laterality: N/A;  COLON POLYP, HEMORRHOIDS    VASECTOMY          Current Outpatient Medications:     amLODIPine-benazepril (LOTREL 5-20) 5-20 MG per capsule, Take 1 capsule by mouth Daily., Disp: 90 capsule, Rfl: 1    meloxicam (MOBIC) 15 MG tablet, TAKE 1 TABLET BY MOUTH EVERY DAY,  "Disp: 90 tablet, Rfl: 1    Omega-3 Fatty Acids (fish oil) 1200 MG capsule capsule, 1 capsule Every Night., Disp: , Rfl:     OBJECTIVE  Vital Signs:   /72 (BP Location: Left arm, Patient Position: Sitting, Cuff Size: Large Adult)   Pulse 57   Temp 97.7 °F (36.5 °C) (Temporal)   Ht 185.4 cm (73\")   Wt 108 kg (239 lb)   SpO2 98%   BMI 31.53 kg/m²    Estimated body mass index is 31.53 kg/m² as calculated from the following:    Height as of this encounter: 185.4 cm (73\").    Weight as of this encounter: 108 kg (239 lb).     Wt Readings from Last 3 Encounters:   11/12/24 108 kg (239 lb)   09/16/24 109 kg (241 lb)   08/19/24 109 kg (241 lb)     BP Readings from Last 3 Encounters:   11/12/24 116/72   09/16/24 131/74   08/19/24 140/77       Physical Exam  Vitals reviewed.   Constitutional:       Appearance: Normal appearance. He is well-developed.   HENT:      Head: Normocephalic and atraumatic.      Right Ear: External ear normal.      Left Ear: External ear normal.      Mouth/Throat:      Pharynx: No oropharyngeal exudate.   Eyes:      Conjunctiva/sclera: Conjunctivae normal.      Pupils: Pupils are equal, round, and reactive to light.   Neck:      Vascular: No carotid bruit.   Cardiovascular:      Rate and Rhythm: Normal rate and regular rhythm.      Pulses: Normal pulses.      Heart sounds: Normal heart sounds. No murmur heard.     No friction rub. No gallop.   Pulmonary:      Effort: Pulmonary effort is normal.      Breath sounds: Normal breath sounds. No wheezing or rhonchi.   Skin:     General: Skin is warm and dry.   Neurological:      Mental Status: He is alert and oriented to person, place, and time.      Cranial Nerves: No cranial nerve deficit.   Psychiatric:         Mood and Affect: Mood and affect normal.         Behavior: Behavior normal.         Thought Content: Thought content normal.         Judgment: Judgment normal.          Result Review    [facility]          5/6/2024    08:07   CMP   Glucose 105  "   BUN 29    Creatinine 0.99    EGFR 85.6    Sodium 138    Potassium 4.1    Chloride 104    Calcium 9.0    Total Protein 6.3    Albumin 4.2    Globulin 2.1    Total Bilirubin 0.4    Alkaline Phosphatase 89    AST (SGOT) 17    ALT (SGPT) 14    Albumin/Globulin Ratio 2.0    BUN/Creatinine Ratio 29.3    Anion Gap 10.6      CBC          5/6/2024    08:07   CBC   WBC 4.67    RBC 5.02    Hemoglobin 15.2    Hematocrit 45.5    MCV 90.6    MCH 30.3    MCHC 33.4    RDW 12.3    Platelets 180      Lipid Panel          5/6/2024    08:07   Lipid Panel   Total Cholesterol 223    Triglycerides 445    HDL Cholesterol 37    VLDL Cholesterol 77    LDL Cholesterol  109    LDL/HDL Ratio 2.62      TSH          5/6/2024    08:07   TSH   TSH 1.300      PSA          5/6/2024    08:07   PSA   PSA 6.720            The above data has been reviewed by YAMILET Page 11/12/2024 11:31 EST.          Patient Care Team:  Carmela Willis APRN as PCP - General (Family Medicine)  Padmini Guaman APRN as Nurse Practitioner (Nurse Practitioner)            ASSESSMENT & PLAN    Diagnoses and all orders for this visit:    1. Annual physical exam (Primary)  -     Comprehensive Metabolic Panel; Future  -     CBC & Differential; Future  -     TSH; Future  -     Lipid Panel; Future    2. Essential hypertension  Comments:  b/p well controlled, cont current medications.  Orders:  -     amLODIPine-benazepril (LOTREL 5-20) 5-20 MG per capsule; Take 1 capsule by mouth Daily.  Dispense: 90 capsule; Refill: 1    3. Elevated PSA measurement  -     PSA DIAGNOSTIC; Future     The patient is advised to continue current medications, continue current healthy lifestyle patterns, and return for routine annual checkups.      Tobacco Use: Low Risk  (11/12/2024)    Patient History     Smoking Tobacco Use: Never     Smokeless Tobacco Use: Never     Passive Exposure: Not on file       Follow Up     Return in about 6 months (around 5/12/2025).      Patient was given  instructions and counseling regarding his condition or for health maintenance advice. Please see specific information pulled into the AVS if appropriate.   I have reviewed information obtained and documented by others and I have confirmed the accuracy of this documented note.    YAMILET Page    The patient is advised to continue current medications, continue current healthy lifestyle patterns, and return for routine annual checkups.      Answers submitted by the patient for this visit:  Other (Submitted on 11/10/2024)  Please describe your symptoms.: Yearly Physical  Have you had these symptoms before?: Yes  How long have you been having these symptoms?: Greater than 2 weeks  Please list any medications you are currently taking for this condition.: Amlodipine, Fish Oil  Primary Reason for Visit (Submitted on 11/10/2024)  What is the primary reason for your visit?: Problem Not Listed

## 2024-11-13 DIAGNOSIS — E87.5 HYPERKALEMIA: Primary | ICD-10-CM

## 2024-11-18 RX ORDER — FENOFIBRATE 145 MG/1
145 TABLET, COATED ORAL DAILY
Qty: 90 TABLET | Refills: 1 | Status: SHIPPED | OUTPATIENT
Start: 2024-11-18

## 2024-12-03 DIAGNOSIS — R97.20 ELEVATED PSA: Primary | ICD-10-CM

## 2025-02-18 ENCOUNTER — PREP FOR SURGERY (OUTPATIENT)
Dept: OTHER | Facility: HOSPITAL | Age: 65
End: 2025-02-18
Payer: COMMERCIAL

## 2025-02-18 ENCOUNTER — OFFICE VISIT (OUTPATIENT)
Dept: SURGERY | Facility: CLINIC | Age: 65
End: 2025-02-18
Payer: COMMERCIAL

## 2025-02-18 VITALS
SYSTOLIC BLOOD PRESSURE: 140 MMHG | BODY MASS INDEX: 32.37 KG/M2 | OXYGEN SATURATION: 97 % | WEIGHT: 244.27 LBS | HEART RATE: 54 BPM | DIASTOLIC BLOOD PRESSURE: 84 MMHG | HEIGHT: 73 IN

## 2025-02-18 DIAGNOSIS — Z12.11 SCREENING FOR MALIGNANT NEOPLASM OF COLON: Primary | ICD-10-CM

## 2025-02-18 DIAGNOSIS — Z86.0100 HISTORY OF COLONIC POLYPS: ICD-10-CM

## 2025-02-18 DIAGNOSIS — R97.20 ELEVATED PSA: ICD-10-CM

## 2025-02-18 RX ORDER — SODIUM CHLORIDE 9 MG/ML
40 INJECTION, SOLUTION INTRAVENOUS AS NEEDED
OUTPATIENT
Start: 2025-02-18

## 2025-02-18 RX ORDER — SODIUM CHLORIDE 0.9 % (FLUSH) 0.9 %
10 SYRINGE (ML) INJECTION AS NEEDED
OUTPATIENT
Start: 2025-02-18

## 2025-02-18 RX ORDER — SODIUM CHLORIDE 0.9 % (FLUSH) 0.9 %
3 SYRINGE (ML) INJECTION EVERY 12 HOURS SCHEDULED
OUTPATIENT
Start: 2025-02-18

## 2025-02-18 RX ORDER — SODIUM, POTASSIUM,MAG SULFATES 17.5-3.13G
SOLUTION, RECONSTITUTED, ORAL ORAL
Qty: 354 ML | Refills: 0 | Status: SHIPPED | OUTPATIENT
Start: 2025-02-18

## 2025-02-18 NOTE — PROGRESS NOTES
Chief Complaint: Colonoscopy    Subjective      Colonoscopy consultation       History of Present Illness  Davi Garcia is a 65 y.o. male presents to [facility] SUR[facility]ERY for colonoscopy consultation.    Patient presents today without complaints for screening colonoscopy.  He denies any abdominal pain, change in bowel habit, or rectal bleeding.  Denies any family history of colorectal cancer.  Admits to history of colonic polyps.    Denies GABBI.  Denies any cardiac issues.  Denies taking any GLP-1 receptors.    8/24: Colonoscopy ([place]): Cecum - tubular adenoma; Rectum - tubular adenoma; external hemorrhoids.     5/22: colonoscopy ([place]): rectum - hyperplastic; cecum - tubular adenoma; ascending - tubular adenoma; transverse- tubular adenoma; sigmoid- tubular adenoma.     10/16: colonoscopy ([place]): cecum - tubular adenoma.      7/13: colonoscopy ([place]): transverse- tubular adenoma; descending - 2 tubular adenomas    Objective     Past Medical History:   Diagnosis Date    Colon polyp     High blood pressure     High cholesterol     Kidney stones     HX of       Past Surgical History:   Procedure Laterality Date    COLONOSCOPY      COLONOSCOPY N/A 05/06/2022    Procedure: COLONOSCOPY with biopies and cold snares/ clip;  Surgeon: Jairo Copeland MD;  Location: [facility] ENDOSCOPY;  Service: General;  Laterality: N/A;  colon polyps     COLONOSCOPY N/A 11/10/2023    Procedure: COLONOSCOPY with cold snare polypectomy;  Surgeon: Jairo Copeland MD;  Location: [facility] ENDOSCOPY;  Service: General;  Laterality: N/A;  colon polyps    COLONOSCOPY N/A 08/08/2024    Procedure: COLONOSCOPY W/ POLYPECTOMY;  Surgeon: Jairo Copeland MD;  Location: [facility] ENDOSCOPY;  Service: General;  Laterality: N/A;  COLON POLYP, HEMORRHOIDS    VASECTOMY         Outpatient Medications Marked as Taking for the 2/18/25 encounter (Office Visit) with Padmini Guaman APRN   Medication Sig Dispense Refill     "amLODIPine-benazepril (LOTREL 5-20) 5-20 MG per capsule Take 1 capsule by mouth Daily. 90 capsule 1    fenofibrate (Tricor) 145 MG tablet Take 1 tablet by mouth Daily. 90 tablet 1    meloxicam (MOBIC) 15 MG tablet TAKE 1 TABLET BY MOUTH EVERY DAY 90 tablet 1    Omega-3 Fatty Acids (fish oil) 1200 MG capsule capsule 1 capsule Every Night.         Allergies   Allergen Reactions    Statins Other (See Comments)     Leg cramps    Zetia [Ezetimibe] Myalgia        Family History   Problem Relation Age of Onset    Urolithiasis Father     [place]pancho Hyperthermia Neg Hx        Social History     Socioeconomic History    Marital status:    Tobacco Use    Smoking status: Never    Smokeless tobacco: Never   Vaping Use    Vaping status: Never Used   Substance and Sexual Activity    Alcohol use: Yes     Alcohol/week: 1.0 standard drink of alcohol     Types: 1 Cans of beer per week     Comment: Current some day  drinks rarely; beer    Drug use: Never    Sexual activity: Yes     Partners: Female     Birth control/protection: Vasectomy     Comment: Vasectomy       Review of Systems   Constitutional:  Negative for chills and fever.   Gastrointestinal:  Negative for abdominal distention, abdominal pain, anal bleeding, blood in stool, constipation, diarrhea and rectal pain.        Vital Signs:   /84 (BP Location: Left arm, Patient Position: Sitting, Cuff Size: Large Adult)   Pulse 54   Ht 185.4 cm (73\")   Wt 111 kg (244 lb 4.3 oz)   SpO2 97%   BMI 32.23 kg/m²      Physical Exam  Vitals and nursing note reviewed.   Constitutional:       General: He is not in acute distress.     Appearance: He is obese. He is not ill-appearing.   HENT:      Head: Normocephalic and atraumatic.   Cardiovascular:      Rate and Rhythm: Normal rate.   Pulmonary:      Effort: Pulmonary effort is normal.      Breath sounds: No stridor.   Abdominal:      Palpations: Abdomen is soft.      Tenderness: There is no guarding.   Musculoskeletal:         " General: No deformity. Normal range of motion.   Skin:     General: Skin is warm and dry.      Coloration: Skin is not jaundiced.   Neurological:      General: No focal deficit present.      Mental Status: He is alert and oriented to person, place, and time.   Psychiatric:         Mood and Affect: Mood normal.         Thought Content: Thought content normal.          Result Review :          []  Laboratory  []  Radiology  []  Pathology  []  Microbiology  []  EKG/Telemetry   []  Cardiology/Vascular   []  Old records  I spent 15 minutes caring for Davi on this date of service. This time includes time spent by me in the following activities: reviewing tests, obtaining and/or reviewing a separately obtained history, performing a medically appropriate examination and/or evaluation, ordering medications, tests, or procedures, and documenting information in the medical record        Assessment and Plan    Diagnoses and all orders for this visit:    1. Screening for malignant neoplasm of colon (Primary)    2. History of colonic polyps    3. Elevated PSA  -     PSA DIAGNOSTIC; Future    Other orders  -     sodium-potassium-magnesium sulfates (Suprep Bowel Prep Kit) 17.5-3.13-1.6 GM/177ML solution oral solution; Take as directed.  Instructions given in office.  Dispense: 2 bottles  Dispense: 354 mL; Refill: 0        Follow Up   Return for Schedule colonoscopy with Dr. Copeland on 8/14/2025 at [facility].    Hospital arrival time: 12:00.    Possible risks/complications, benefits, and alternatives to surgical or invasive procedures have been explained to patient and/or legal guardian.    Patient has been evaluated and can tolerate anesthesia and/or sedation. Risks, benefits, and alternatives to anesthesia and sedation have been explained to the patient and/or legal guardian. Patient verbalizes understanding and is willing to proceed with the above plan.     Patient was given instructions and counseling regarding  his condition or for health maintenance advice. Please see specific information pulled into the AVS if appropriate.     As always, it has been a pleasure to participate in your patient's care. Please call with questions or concerns.

## 2025-02-19 ENCOUNTER — TELEPHONE (OUTPATIENT)
Dept: GASTROENTEROLOGY | Facility: CLINIC | Age: 65
End: 2025-02-19
Payer: COMMERCIAL

## 2025-02-19 NOTE — TELEPHONE ENCOUNTER
Pts wife has called and states her daughter is a patient of ours and completed a colonoscopy and was told that patients  was given permission by Dr Cabrera to schedule appt.   Pt is scheduled for a colonoscopy on 8/14/25. Patient has a history of polyps. Please advise if we can schedule patient for a screening call to get him scheduled for a 1 yr recall. Please advise.   Pts  can be contacted at [phone] to schedule colon screening call.

## 2025-02-21 ENCOUNTER — LAB (OUTPATIENT)
Dept: LAB | Facility: HOSPITAL | Age: 65
End: 2025-02-21
Payer: COMMERCIAL

## 2025-02-21 DIAGNOSIS — R97.20 ELEVATED PSA: ICD-10-CM

## 2025-02-21 LAB — PSA SERPL-MCNC: 6.35 NG/ML (ref 0–4)

## 2025-02-21 PROCEDURE — 84153 ASSAY OF PSA TOTAL: CPT

## 2025-02-28 NOTE — PROGRESS NOTES
Chief Complaint: Elevated PSA (F/u)    Subjective         History of Present Illness  Davi Garcia is a 65 y.o. male presents to [facility] UROLOGY to be seen for elevated PSA.    The patient was previously seen in our office by Dr. Svetlana Cooper with last office visit on 2021 for bladder wall thickening.  He underwent cystoscopy at that time.  The patient did have an MRI of the prostate completed on 2021 which showed BPH and chronic prostatitis.  There were no suspicious lesions suggestive of possible malignancy on that MRI.  The patient is here today to follow-up.    The patient has had an elevated PSA for many years.  His PSA has been slowly increasing.  He denies any bothersome urinary symptoms or changes in his urinary habits.     Frequency-denies     Urgency-sometimes     Incontinence-denies     Nocturia-admits, 1-2 X per night     Dysuria-denies     Perineal pain-denies     Stream-good     GH-denies     History of stones-admits, always been able to pass the stones, has not passed a stone in over 4 years      surgeries-denies     Family history of  malignancy-denies     Cardiopulmonary-HTN    Anticoagulants-none     Smoker-denies     PSA  2025 6.35  2024 6.78  2024 6.72  3/6/2023 6.3  3/1/2022 6.27  2021 4.94  2021 5.71    MRI Prostate W & WO Contrast  Order: [id number]  Narrative    RADIOLOGY REPORT    FACILITY:  [facility]  UNIT/AGE/GENDER: PEDROS  OP      AGE:61 Y          SEX:M  PATIENT NAME/:  DAVI GARCIA    1960  UNIT NUMBER:  [id number]  ACCOUNT NUMBER:  [id number]  ACCESSION NUMBER:  [id number]    MRI of the prostate with and without contrast dated 2021.    INDICATION: Elevated PSA. No previous biopsy.    TECHNIQUE: Multi parametric prostate protocol with high-resolution T1 and T2 weighted sequences, precontrast, diffusion weighted imaging and multiphase dynamic gadolinium enhanced gradient echo  imaging during contrast administration. Post processing  includes construction of 3-D prostate volume and 3-D regions of interest for potential biopsy when applicable, performed on a separate workstation by the radiologist.    COMPARISON: None    FINDINGS: Motion artifact degrades image quality.    Prostate gland measures 4.6 x 3.7 x 3.9 cm for a total prostate volume of 33.38 mL.    Heterogeneous signal intensity and enhancement in the mildly enlarged central gland is related to BPH.    There is hazy and streaky decreased T2 signal intensity in the bilateral peripheral zones without significant restricted diffusion and favored to be related to chronic prostatitis.    Allowing for motion, there is no suspicious T2 localizing lesion to suggest significant prostate malignancy.    Thickening of the wall of the urinary bladder which is not well-distended and may be exaggerated by under distention. Cystitis or outlet obstruction is not excluded. Seminal vesicles are symmetric.    Pelvic bowel loops are unremarkable. No ascites or significant lymphadenopathy. There are degenerative changes with no suspicious osseous lesion. Mild fluid and enhancement surrounding the right hip may be synovitis or bursitis.    IMPRESSION:  1. No suspicious T2 localizing lesion to suggest significant prostate malignancy.  2. BPH.  3. Chronic prostatitis.    Dictated by: Cheyenne Santana M.D.    Images and Report reviewed and interpreted by: Cheyenne Santana M.D.    <PS><Electronically signed by: Cheyenne Santana M.D.>  03/01/2021 0810    D: 03/01/2021 0756  T: 03/01/2021 0756  Exam End: 02/26/21 21:30    Specimen Collected: 03/01/21 07:56 Last Resulted: 03/01/21 08:11   Received From: [facility]  Result Received: 01/02/24 08:41       Objective     Past Medical History:   Diagnosis Date    Colon polyp     Elevated PSA     High blood pressure     High cholesterol     Kidney stones     HX of       Past Surgical History:   Procedure  Laterality Date    COLONOSCOPY      COLONOSCOPY N/A 05/06/2022    Procedure: COLONOSCOPY with biopies and cold snares/ clip;  Surgeon: Jairo Copeland MD;  Location: [facility] ENDOSCOPY;  Service: General;  Laterality: N/A;  colon polyps     COLONOSCOPY N/A 11/10/2023    Procedure: COLONOSCOPY with cold snare polypectomy;  Surgeon: Jairo Copeland MD;  Location: [facility] ENDOSCOPY;  Service: General;  Laterality: N/A;  colon polyps    COLONOSCOPY N/A 08/08/2024    Procedure: COLONOSCOPY W/ POLYPECTOMY;  Surgeon: Jairo Copeland MD;  Location: [facility] ENDOSCOPY;  Service: General;  Laterality: N/A;  COLON POLYP, HEMORRHOIDS    VASECTOMY           Current Outpatient Medications:     amLODIPine-benazepril (LOTREL 5-20) 5-20 MG per capsule, Take 1 capsule by mouth Daily., Disp: 90 capsule, Rfl: 1    fenofibrate (Tricor) 145 MG tablet, Take 1 tablet by mouth Daily., Disp: 90 tablet, Rfl: 1    meloxicam (MOBIC) 15 MG tablet, TAKE 1 TABLET BY MOUTH EVERY DAY, Disp: 90 tablet, Rfl: 1    Omega-3 Fatty Acids (fish oil) 1200 MG capsule capsule, 1 capsule Every Night., Disp: , Rfl:     sodium-potassium-magnesium sulfates (Suprep Bowel Prep Kit) 17.5-3.13-1.6 GM/177ML solution oral solution, Take as directed.  Instructions given in office.  Dispense: 2 bottles, Disp: 354 mL, Rfl: 0    Allergies   Allergen Reactions    Statins Other (See Comments)     Leg cramps    Zetia [Ezetimibe] Myalgia        Family History   Problem Relation Age of Onset    Urolithiasis Father     Malig Hyperthermia Neg Hx        Social History     Socioeconomic History    Marital status:    Tobacco Use    Smoking status: Never    Smokeless tobacco: Never   Vaping Use    Vaping status: Never Used   Substance and Sexual Activity    Alcohol use: Yes     Alcohol/week: 1.0 standard drink of alcohol     Types: 1 Cans of beer per week     Comment: Current some day  drinks rarely; beer    Drug use: Never    Sexual activity: Yes     Partners: Female      "Birth control/protection: Vasectomy     Comment: Vasectomy       Vital Signs:   Resp 12   Ht 185.4 cm (73\")   Wt 111 kg (244 lb)   BMI 32.19 kg/m²      Physical Exam  Vitals and nursing note reviewed.   Constitutional:       General: He is not in acute distress.     Appearance: Normal appearance. He is not toxic-appearing.   Pulmonary:      Effort: Pulmonary effort is normal. No respiratory distress.   Neurological:      General: No focal deficit present.      Mental Status: He is alert and oriented to person, place, and time.          Result Review :   The following data was reviewed by: YAMILET Martin on 03/04/2025:  Results for orders placed or performed in visit on 03/04/25   Bladder Scan    Collection Time: 03/04/25  7:53 AM   Result Value Ref Range    Urine Volume 0       PSA          5/6/2024    08:07 11/12/2024    12:04 2/21/2025    06:47   PSA   PSA 6.720  6.780  6.350      Results for orders placed or performed in visit on 03/04/25   Bladder Scan    Collection Time: 03/04/25  7:53 AM   Result Value Ref Range    Urine Volume 0      Bladder Scan interpretation 03/04/2025    Estimation of residual urine via BVI 3000 [address] Bladder Scan  MA/nurse performing: Elba BUENO MA  Residual Urine: 0 ml  Indication: Elevated prostate specific antigen (PSA)    Benign prostatic hyperplasia with nocturia   Position: Supine  Examination: Incremental scanning of the suprapubic area using 2.0 MHz transducer using copious amounts of acoustic gel.   Findings: An anechoic area was demonstrated which represented the bladder, with measurement of residual urine as noted. I inspected this myself. In that the residual urine was stable or insignificant, refer to plan for treatment and plan necessary at this time.           Procedures        Assessment and Plan    Diagnoses and all orders for this visit:    1. Elevated prostate specific antigen (PSA) (Primary)  -     Bladder Scan  -     MRI Prostate With & Without Contrast; " Future    2. Benign prostatic hyperplasia with nocturia    Elevated PSA-lab results discussed at length with patient.  Discussed several options with patient including a standard prostate biopsy; obtaining an MRI of prostate with possible subsequent fusion biopsy as results dictate; or to treat with a month-long course of antibiotics and then repeat the PSA in 3 months.  After lengthy discussion with the patient, we will go ahead and order an MRI of the prostate to delineate any underlying etiology of elevated PSA and potentially provide mapping for fusion biopsy.    Patient also aware that the majority of prostate cancers detected are low or very low risk that are amenable to active surveillance protocols.  Patient notes understanding that whether or not further workup for prostate cancer is pursued that  a diagnosis of clinically significant prostate cancer remains uncertain.  Discussed AUA guidelines for prostate cancer detection noting that PSA screening is not recommended after the age of 70 given life expectancy and the indolent nature of most prostate cancers.    Patient does not bothered by any urinary symptoms at this time.  He is not interested in medications for BPH at this time.    Patient participated in the discussion and asked appropriate questions.   All questions answered to the best of my ability and his apparent satisfaction.      Will plan to see the patient back in the office 1 week after MRI to review results.      Follow Up   No follow-ups on file.  Patient was given instructions and counseling regarding his condition or for health maintenance advice. Please see specific information pulled into the AVS if appropriate.         This document has been electronically signed by YAMILET Martin  March 4, 2025 08:22 EST

## 2025-03-04 ENCOUNTER — OFFICE VISIT (OUTPATIENT)
Dept: UROLOGY | Age: 65
End: 2025-03-04
Payer: COMMERCIAL

## 2025-03-04 VITALS — BODY MASS INDEX: 32.34 KG/M2 | WEIGHT: 244 LBS | RESPIRATION RATE: 12 BRPM | HEIGHT: 73 IN

## 2025-03-04 DIAGNOSIS — R97.20 ELEVATED PROSTATE SPECIFIC ANTIGEN (PSA): Primary | ICD-10-CM

## 2025-03-04 DIAGNOSIS — R35.1 BENIGN PROSTATIC HYPERPLASIA WITH NOCTURIA: ICD-10-CM

## 2025-03-04 DIAGNOSIS — N40.1 BENIGN PROSTATIC HYPERPLASIA WITH NOCTURIA: ICD-10-CM

## 2025-03-04 LAB — URINE VOLUME: 0

## 2025-03-04 PROCEDURE — 99213 OFFICE O/P EST LOW 20 MIN: CPT | Performed by: NURSE PRACTITIONER

## 2025-03-29 NOTE — PROGRESS NOTES
Chief Complaint: Elevated PSA (1wk f/u)      Subjective         History of Present Illness  Davi Garcia is a 65 y.o. male presents to [facility] UROLOG[facility] to be seen for follow-up.    The patient was previously seen on 3/4/2025 for elevated PSA and BPH with nocturia at that time, the patient was scheduled for MRI of the prostate.  He was not overly bothered by his BPH symptoms and did not desire treatment for that at that visit.  He is here today to follow-up.    MRI of the prostate did show a PI-RADS 4 lesion measuring 13 mm at the left posterolateral base and a PI-RADS 3 lesion involving right mid gland transition zone measuring 8 mm.  PSA density calculated using most recent PSA of 6.35 and prostate size of 38 cc is 0.17.    The patient denies any bothersome urinary symptoms at this time.    MRI PROSTATE W WO CONTRAST  Date of Exam: 4/2/2025 8:00 AM EDT     Indication: Elevated PSA. 6.35     Comparison: MRI prostate 2/26/2021     Technique: Multiparametric 3T MRI of the pelvis was obtained prior to and after the uneventful administration of Multihance. Imaging was performed per prostate protocol.     Advanced 3D workstation manipulation and review of the data set was performed by the interpreting physician to further define anatomy and possible pathology. Images of areas of interest were created utilizing various techniques. These images were saved   and transferred to PACS if significant.     Findings:  Prostate size: 4.5 [CC] x 3.8 [AP] x 4.7 [transverse] cm for an overall volume of 38 cc.  PSAD = 0.16     Image quality adequate.     Hemorrhage: None.     Peripheral Zone: Left peripheral zone lesion at the base described below. Additional areas of scattered heterogeneously hypointense signal throughout the peripheral zone suggesting sequela of prostatitis with similar configuration to the prior study.     Transition Zone: Heterogeneous transition zone with BPH nodules. Right transition  zone lesion below     Lesion 1  PI-RADS category: 4, High probability  T2: 4, circumscribed, homogeneous moderate hypointense focus/mass confined to prostate and <1.5 cm in greatest dimension  DWI: 4, focal markedly hypointense on ADC and markedly hyperintense on high b value DWI; <1.5 cm in greatest dimension  DCE: Positive  Size: 13 x 7 mm, series 3 image 14-16 (series 6 image 16)  Side: Left.  Zone: Peripheral  Level of prostate: Base  Location within transverse plane: Posterolateral  Extraprostatic extension: Abuts the prostatic capsule     Lesion 2  PI-RADS category: 3, Intermediate probability  T2: 3, heterogeneous signal intensity or non-circumscribed, rounded, moderate hypointensity  DWI: 3, focal mildly/moderately hypointense on ADC and isointense/mildly hyperintense on high b value DWI  DCE: Negative - No early enhancement  Size: 8 x 7 mm, series 3 image 20-22 (series 6 image 22-23)  Side: Right.  Zone: Transition  Level of prostate: [place]  Location within transverse plane: Anterior  Extraprostatic extension: Does not abut the prostatic capsule     Seminal vesicles: No acute abnormality..     Lymph nodes: No pelvic lymphadenopathy.     Osseous structures: No aggressive osseous lesion. Severe right and moderate left hip osteoarthritis. Right hip joint effusion.     Additional findings: Bladder wall thickening which may relate to underdistention and/or chronic outlet obstruction. No acute abnormality of the rectosigmoid colon. No free fluid in the pelvis.     IMPRESSION:  Impression:  1. PI-RADS 4 peripheral zone lesion measuring 13 mm at left posterolateral base. Lesion abuts the capsule without jorge evidence of EPE.  2. PI-RADS 3 lesion involving right mid gland transition zone measuring 8 mm.  3. No pelvic lymphadenopathy.  4. No suspicious osseous lesion. Severe right hip osteoarthritis.     Overall PI-RADS 4 exam.        Electronically Signed: Luisito Mejía MD    4/7/2025 1:49 PM EDT       Previous  visit 3/4/2025:  Davi Garcia is a 65 y.o. male presents to [facility] UROLOG[facility] to be seen for elevated PSA.     The patient was previously seen in our office by Dr. Svetlana Cooper with last office visit on 2021 for bladder wall thickening.  He underwent cystoscopy at that time.  The patient did have an MRI of the prostate completed on 2021 which showed BPH and chronic prostatitis.  There were no suspicious lesions suggestive of possible malignancy on that MRI.  The patient is here today to follow-up.     The patient has had an elevated PSA for many years.  His PSA has been slowly increasing.  He denies any bothersome urinary symptoms or changes in his urinary habits.      Frequency-denies      Urgency-sometimes      Incontinence-denies      Nocturia-admits, 1-2 X per night      Dysuria-denies      Perineal pain-denies      Stream-good      GH-denies      History of stones-admits, always been able to pass the stones, has not passed a stone in over 4 years       surgeries-denies      Family history of  malignancy-denies      Cardiopulmonary-HTN     Anticoagulants-none      Smoker-denies      PSA  2025 6.35  2024 6.78  2024 6.72  3/6/2023 6.3  3/1/2022 6.27  2021 4.94  2021 5.71     MRI Prostate W & WO Contrast  Order: [id number]  Narrative     RADIOLOGY REPORT    FACILITY:  [facility]  UNIT/AGE/GENDER: HAZEL  OP      AGE:61 Y          SEX:M  PATIENT NAME/:  DAVI GARCIA    1960  UNIT NUMBER:  [id number]  ACCOUNT NUMBER:  [id number]  ACCESSION NUMBER:  [id number]    MRI of the prostate with and without contrast dated 2021.    INDICATION: Elevated PSA. No previous biopsy.    TECHNIQUE: Multi parametric prostate protocol with high-resolution T1 and T2 weighted sequences, precontrast, diffusion weighted imaging and multiphase dynamic gadolinium enhanced gradient echo imaging during contrast administration. Post  processing  includes construction of 3-D prostate volume and 3-D regions of interest for potential biopsy when applicable, performed on a separate workstation by the radiologist.    COMPARISON: None    FINDINGS: Motion artifact degrades image quality.    Prostate gland measures 4.6 x 3.7 x 3.9 cm for a total prostate volume of 33.38 mL.    Heterogeneous signal intensity and enhancement in the mildly enlarged central gland is related to BPH.    There is hazy and streaky decreased T2 signal intensity in the bilateral peripheral zones without significant restricted diffusion and favored to be related to chronic prostatitis.    Allowing for motion, there is no suspicious T2 localizing lesion to suggest significant prostate malignancy.    Thickening of the wall of the urinary bladder which is not well-distended and may be exaggerated by under distention. Cystitis or outlet obstruction is not excluded. Seminal vesicles are symmetric.    Pelvic bowel loops are unremarkable. No ascites or significant lymphadenopathy. There are degenerative changes with no suspicious osseous lesion. Mild fluid and enhancement surrounding the right hip may be synovitis or bursitis.    IMPRESSION:  1. No suspicious T2 localizing lesion to suggest significant prostate malignancy.  2. BPH.  3. Chronic prostatitis.    Dictated by: Cheyenne Santana M.D.    Images and Report reviewed and interpreted by: Cheyenne Santana M.D.    <PS><Electronically signed by: Cheyenne Santana M.D.>  03/01/2021 0810    D: 03/01/2021 0756  T: 03/01/2021 0756       Exam End: 02/26/21 21:30     Specimen Collected: 03/01/21 07:56 Last Resulted: 03/01/21 08:11   Received From: [facility]  Result Received: 01/02/24 08:41            Objective     Past Medical History:   Diagnosis Date    Colon polyp     Elevated PSA     High blood pressure     High cholesterol     Kidney stones     HX of       Past Surgical History:   Procedure Laterality Date    COLONOSCOPY       COLONOSCOPY N/A 05/06/2022    Procedure: COLONOSCOPY with biopies and cold snares/ clip;  Surgeon: Jairo Copeland MD;  Location: [facility] ENDOSCOPY;  Service: General;  Laterality: N/A;  colon polyps     COLONOSCOPY N/A 11/10/2023    Procedure: COLONOSCOPY with cold snare polypectomy;  Surgeon: Jairo Copeland MD;  Location: [facility] ENDOSCOPY;  Service: General;  Laterality: N/A;  colon polyps    COLONOSCOPY N/A 08/08/2024    Procedure: COLONOSCOPY W/ POLYPECTOMY;  Surgeon: Jairo Copeland MD;  Location: [facility] ENDOSCOPY;  Service: General;  Laterality: N/A;  COLON POLYP, HEMORRHOIDS    VASECTOMY           Current Outpatient Medications:     amLODIPine-benazepril (LOTREL 5-20) 5-20 MG per capsule, Take 1 capsule by mouth Daily., Disp: 90 capsule, Rfl: 1    fenofibrate (Tricor) 145 MG tablet, Take 1 tablet by mouth Daily., Disp: 90 tablet, Rfl: 1    meloxicam (MOBIC) 15 MG tablet, TAKE 1 TABLET BY MOUTH EVERY DAY, Disp: 90 tablet, Rfl: 1    Omega-3 Fatty Acids (fish oil) 1200 MG capsule capsule, 1 capsule Every Night., Disp: , Rfl:     sodium-potassium-magnesium sulfates (Suprep Bowel Prep Kit) 17.5-3.13-1.6 GM/177ML solution oral solution, Take as directed.  Instructions given in office.  Dispense: 2 bottles, Disp: 354 mL, Rfl: 0    Allergies   Allergen Reactions    Statins Other (See Comments)     Leg cramps    Zetia [Ezetimibe] Myalgia        Family History   Problem Relation Age of Onset    Urolithiasis Father     Malig Hyperthermia Neg Hx        Social History     Socioeconomic History    Marital status:    Tobacco Use    Smoking status: Never    Smokeless tobacco: Never   Vaping Use    Vaping status: Never Used   Substance and Sexual Activity    Alcohol use: Yes     Alcohol/week: 1.0 standard drink of alcohol     Types: 1 Cans of beer per week     Comment: Current some day  drinks rarely; beer    Drug use: Never    Sexual activity: Yes     Partners: Female     Birth control/protection: Vasectomy  "    Comment: Vasectomy       Vital Signs:   Resp 12   Ht 185.4 cm (73\")   Wt 111 kg (244 lb)   BMI 32.19 kg/m²      Physical Exam  Vitals and nursing note reviewed.   Constitutional:       General: He is not in acute distress.     Appearance: Normal appearance. He is not toxic-appearing.   Pulmonary:      Effort: Pulmonary effort is normal. No respiratory distress.   Neurological:      General: No focal deficit present.      Mental Status: He is alert and oriented to person, place, and time.          Result Review :   The following data was reviewed by: YAMILET Martin on 04/09/2025:  Results for orders placed or performed in visit on 04/09/25   Bladder Scan    Collection Time: 04/09/25  7:51 AM   Result Value Ref Range    Urine Volume 0       PSA          5/6/2024    08:07 11/12/2024    12:04 2/21/2025    06:47   PSA   PSA 6.720  6.780  6.350              Procedures        Assessment and Plan    Diagnoses and all orders for this visit:    1. Elevated prostate specific antigen (PSA) (Primary)  -     Bladder Scan    2. Benign prostatic hyperplasia with nocturia    3. Abnormal MRI    MRI results were reviewed in detail with the patient.  It is my recommendation that the patient proceed with MRI fusion biopsy of the prostate.  I did offer him dates for this procedure with Dr. Svetlana Cooper of 4/24/2025 and 5/28/2025.  We did discuss the potential risk and benefit of the procedure in detail.  The patient would like to talk with his wife about this before he makes any definite decision and will call me tomorrow to let me know if he would like to move forward with biopsy.  We did discuss that if he so chose to not proceed with biopsy there is a possibility that we could be missing a prostate malignancy that could potentially be life-threatening.  He verbalizes understanding and will get back with me tomorrow to let me know how he would like to proceed.    The patient continues to be unbothered by any urinary " symptoms and does not desire treatment for BPH at this time.      Follow Up   No follow-ups on file.  Patient was given instructions and counseling regarding his condition or for health maintenance advice. Please see specific information pulled into the AVS if appropriate.         This document has been electronically signed by YAMILET Martin  April 9, 2025 08:36 EDT

## 2025-04-02 ENCOUNTER — HOSPITAL ENCOUNTER (OUTPATIENT)
Dept: MRI IMAGING | Facility: HOSPITAL | Age: 65
Discharge: HOME | End: 2025-04-02
Admitting: NURSE PRACTITIONER
Payer: COMMERCIAL

## 2025-04-02 DIAGNOSIS — R97.20 ELEVATED PROSTATE SPECIFIC ANTIGEN (PSA): ICD-10-CM

## 2025-04-02 LAB
CREAT BLDA-MCNC: 1.2 MG/DL (ref 0.6–1.3)
EGFRCR SERPLBLD CKD-EPI 2021: 67.5 ML/MIN/1.73

## 2025-04-02 PROCEDURE — 25510000002 GADOBENATE DIMEGLUMINE 529 MG/ML SOLUTION: Performed by: NURSE PRACTITIONER

## 2025-04-02 PROCEDURE — A9577 INJ MULTIHANCE: HCPCS | Performed by: NURSE PRACTITIONER

## 2025-04-02 PROCEDURE — 72197 MRI PELVIS W/O & W/DYE: CPT

## 2025-04-02 PROCEDURE — 82565 ASSAY OF CREATININE: CPT

## 2025-04-02 RX ADMIN — GADOBENATE DIMEGLUMINE 20 ML: 529 INJECTION, SOLUTION INTRAVENOUS at 08:54

## 2025-04-09 ENCOUNTER — OFFICE VISIT (OUTPATIENT)
Dept: UROLOGY | Age: 65
End: 2025-04-09
Payer: COMMERCIAL

## 2025-04-09 VITALS — BODY MASS INDEX: 32.34 KG/M2 | RESPIRATION RATE: 12 BRPM | WEIGHT: 244 LBS | HEIGHT: 73 IN

## 2025-04-09 DIAGNOSIS — R93.89 ABNORMAL MRI: ICD-10-CM

## 2025-04-09 DIAGNOSIS — N40.1 BENIGN PROSTATIC HYPERPLASIA WITH NOCTURIA: ICD-10-CM

## 2025-04-09 DIAGNOSIS — R97.20 ELEVATED PROSTATE SPECIFIC ANTIGEN (PSA): Primary | ICD-10-CM

## 2025-04-09 DIAGNOSIS — R35.1 BENIGN PROSTATIC HYPERPLASIA WITH NOCTURIA: ICD-10-CM

## 2025-04-09 LAB — URINE VOLUME: 0

## 2025-04-09 PROCEDURE — 99213 OFFICE O/P EST LOW 20 MIN: CPT | Performed by: NURSE PRACTITIONER

## 2025-04-10 ENCOUNTER — PREP FOR SURGERY (OUTPATIENT)
Dept: OTHER | Facility: HOSPITAL | Age: 65
End: 2025-04-10
Payer: COMMERCIAL

## 2025-04-10 ENCOUNTER — TELEPHONE (OUTPATIENT)
Dept: UROLOGY | Age: 65
End: 2025-04-10
Payer: COMMERCIAL

## 2025-04-10 ENCOUNTER — CLINICAL SUPPORT (OUTPATIENT)
Dept: GASTROENTEROLOGY | Facility: CLINIC | Age: 65
End: 2025-04-10
Payer: COMMERCIAL

## 2025-04-10 DIAGNOSIS — R97.20 ELEVATED PSA: Primary | ICD-10-CM

## 2025-04-10 DIAGNOSIS — Z86.0100 HISTORY OF COLON POLYPS: ICD-10-CM

## 2025-04-10 DIAGNOSIS — R93.89 ABNORMAL MRI: ICD-10-CM

## 2025-04-10 DIAGNOSIS — Z12.11 SCREENING FOR MALIGNANT NEOPLASM OF COLON: Primary | ICD-10-CM

## 2025-04-10 RX ORDER — SODIUM CHLORIDE 0.9 % (FLUSH) 0.9 %
3 SYRINGE (ML) INJECTION EVERY 12 HOURS SCHEDULED
OUTPATIENT
Start: 2025-04-10

## 2025-04-10 RX ORDER — SODIUM CHLORIDE 0.9 % (FLUSH) 0.9 %
10 SYRINGE (ML) INJECTION AS NEEDED
OUTPATIENT
Start: 2025-04-10

## 2025-04-10 RX ORDER — SODIUM CHLORIDE 9 MG/ML
40 INJECTION, SOLUTION INTRAVENOUS AS NEEDED
OUTPATIENT
Start: 2025-04-10

## 2025-04-10 NOTE — TELEPHONE ENCOUNTER
Patient called the office to discuss MRI fusion biopsy of the prostate.  He would like to proceed with this procedure, but would like to wait until after June 9 when he retires.  I did advise the patient that the next available surgery date for this procedure after his USP would be 6/26/2025.  He would like to go ahead and be scheduled for that date.  Risk, benefits, and alternatives of the patient were discussed at length.  Risks including but not limited to bleeding, infection, damage to surrounding structure, pain, need for further procedures.  Also aware this is a procedure performed under anesthesia which has inherent risks including up to death.  Patient notes understanding, agreeable to proceed.  I did advise the patient that he would be contacted the day before his procedure with an arrival time.  He understands that he needs to be n.p.o. after midnight and will need a  for the procedure as he will have anesthesia.  The patient is not on any blood thinners or GLP-1 medications.  He does not see cardiology.  The patient was advised that he will need to do a fleets enema 3 hours before his arrival time.  All questions were answered to the patient's satisfaction.  I did advise the patient that if he had any concerns or questions before his procedure that he should call our office to let us know.  He verbalizes understanding and agrees with plan of care.

## 2025-04-10 NOTE — PROGRESS NOTES
Davi Garcia  1960  65 y.o.    Reason for call: Screening Colonoscopy  If recall, please list diagnosis: history of colon polyps (recall was with General Surgery)  Prep prescribed: Suprep (already sent in)  Prep instructions reviewed with patient and sent to patient via [employer]  Is the patient currently on any injectable or oral medications for weight loss or diabetes? No  Clearance needed? No  If yes, what clearance is needed? N/A  Clearance has been requested from N/A  The patient has been scheduled for: Colonoscopy    If scheduled for screening colonoscopy Davi Garcia is aware they have been scheduled for a screening colonoscopy. Patient has expressed they are not having any symptoms at all.     After your procedure, you will be contacted with results. Please confirm the best phone # to reach the patient: [phone]  Family history of colon cancer? No  If yes, indicate relative: N/A  Tentative Procedure Date: 08/11/2025    Date/Place of last Scope: [facility]: 08/08/2024: Dr Vyas  Able to obtain report? yes    Family History   Problem Relation Age of Onset    Urolithiasis Father     Malig Hyperthermia Neg Hx     Colon cancer Neg Hx      Past Medical History:   Diagnosis Date    Colon polyp     Elevated PSA     High blood pressure     High cholesterol     Kidney stones     HX of     Allergies   Allergen Reactions    Statins Other (See Comments)     Leg cramps    Zetia [Ezetimibe] Myalgia     Past Surgical History:   Procedure Laterality Date    COLONOSCOPY      COLONOSCOPY N/A 05/06/2022    Procedure: COLONOSCOPY with biopies and cold snares/ clip;  Surgeon: Jairo Copeland MD;  Location: [facility] ENDOSCOPY;  Service: General;  Laterality: N/A;  colon polyps     COLONOSCOPY N/A 11/10/2023    Procedure: COLONOSCOPY with cold snare polypectomy;  Surgeon: Jairo Copeland MD;  Location: [facility] ENDOSCOPY;  Service: General;  Laterality: N/A;  colon polyps    COLONOSCOPY N/A 08/08/2024    Procedure:  COLONOSCOPY W/ POLYPECTOMY;  Surgeon: Jairo Copeland MD;  Location: [facility] ENDOSCOPY;  Service: General;  Laterality: N/A;  COLON POLYP, HEMORRHOIDS    VASECTOMY       Social History     Socioeconomic History    Marital status:    Tobacco Use    Smoking status: Never    Smokeless tobacco: Never   Vaping Use    Vaping status: Never Used   Substance and Sexual Activity    Alcohol use: Yes     Alcohol/week: 1.0 standard drink of alcohol     Types: 1 Cans of beer per week     Comment: Current some day  drinks rarely; beer    Drug use: Never    Sexual activity: Yes     Partners: Female     Birth control/protection: Vasectomy     Comment: Vasectomy       Current Outpatient Medications:     amLODIPine-benazepril (LOTREL 5-20) 5-20 MG per capsule, Take 1 capsule by mouth Daily., Disp: 90 capsule, Rfl: 1    fenofibrate (Tricor) 145 MG tablet, Take 1 tablet by mouth Daily., Disp: 90 tablet, Rfl: 1    Omega-3 Fatty Acids (fish oil) 1200 MG capsule capsule, 1 capsule Every Night., Disp: , Rfl:     meloxicam (MOBIC) 15 MG tablet, TAKE 1 TABLET BY MOUTH EVERY DAY (Patient not taking: Reported on 4/10/2025), Disp: 90 tablet, Rfl: 1    sodium-potassium-magnesium sulfates (Suprep Bowel Prep Kit) 17.5-3.13-1.6 GM/177ML solution oral solution, Take as directed.  Instructions given in office.  Dispense: 2 bottles (Patient not taking: Reported on 4/10/2025), Disp: 354 mL, Rfl: 0

## 2025-04-11 ENCOUNTER — TELEPHONE (OUTPATIENT)
Dept: SURGERY | Facility: CLINIC | Age: 65
End: 2025-04-11
Payer: COMMERCIAL

## 2025-04-11 ENCOUNTER — TELEPHONE (OUTPATIENT)
Dept: GASTROENTEROLOGY | Facility: CLINIC | Age: 65
End: 2025-04-11
Payer: COMMERCIAL

## 2025-04-11 NOTE — TELEPHONE ENCOUNTER
LVM: for patient to call back. Patient said he was going to cancel his Colonoscopy with Dr. Copeland. It has not been canceled and I cannot schedule the Colonoscopy with Dr. Cabrera until the other one is canceled.

## 2025-04-11 NOTE — TELEPHONE ENCOUNTER
PER ROGERS AT THE HUB, PATIENT CALLED AND SAID TO CANCEL HIS COLONOSCOPY WITH DR. LEES.  HE IS HAVING DONE BY SOMEONE ELSE.

## 2025-06-03 ENCOUNTER — OFFICE VISIT (OUTPATIENT)
Dept: FAMILY MEDICINE CLINIC | Facility: CLINIC | Age: 65
End: 2025-06-03
Payer: COMMERCIAL

## 2025-06-03 ENCOUNTER — LAB (OUTPATIENT)
Dept: LAB | Facility: HOSPITAL | Age: 65
End: 2025-06-03
Payer: COMMERCIAL

## 2025-06-03 VITALS
BODY MASS INDEX: 31.49 KG/M2 | HEIGHT: 73 IN | OXYGEN SATURATION: 98 % | SYSTOLIC BLOOD PRESSURE: 117 MMHG | DIASTOLIC BLOOD PRESSURE: 66 MMHG | HEART RATE: 54 BPM | TEMPERATURE: 98.2 F | WEIGHT: 237.6 LBS

## 2025-06-03 DIAGNOSIS — M25.561 CHRONIC PAIN OF RIGHT KNEE: ICD-10-CM

## 2025-06-03 DIAGNOSIS — E87.5 HYPERKALEMIA: ICD-10-CM

## 2025-06-03 DIAGNOSIS — I10 ESSENTIAL HYPERTENSION: ICD-10-CM

## 2025-06-03 DIAGNOSIS — E78.2 MIXED HYPERLIPIDEMIA: Primary | ICD-10-CM

## 2025-06-03 DIAGNOSIS — G89.29 CHRONIC PAIN OF RIGHT KNEE: ICD-10-CM

## 2025-06-03 LAB
ALBUMIN SERPL-MCNC: 4.5 G/DL (ref 3.5–5.2)
ALBUMIN/GLOB SERPL: 1.8 G/DL
ALP SERPL-CCNC: 55 U/L (ref 39–117)
ALT SERPL W P-5'-P-CCNC: 20 U/L (ref 1–41)
ANION GAP SERPL CALCULATED.3IONS-SCNC: 9.1 MMOL/L (ref 5–15)
AST SERPL-CCNC: 22 U/L (ref 1–40)
BASOPHILS # BLD AUTO: 0.03 10*3/MM3 (ref 0–0.2)
BASOPHILS NFR BLD AUTO: 0.7 % (ref 0–1.5)
BILIRUB SERPL-MCNC: 0.4 MG/DL (ref 0–1.2)
BUN SERPL-MCNC: 29 MG/DL (ref 8–23)
BUN/CREAT SERPL: 22.5 (ref 7–25)
CALCIUM SPEC-SCNC: 10 MG/DL (ref 8.6–10.5)
CHLORIDE SERPL-SCNC: 106 MMOL/L (ref 98–107)
CHOLEST SERPL-MCNC: 192 MG/DL (ref 0–200)
CO2 SERPL-SCNC: 24.9 MMOL/L (ref 22–29)
CREAT SERPL-MCNC: 1.29 MG/DL (ref 0.76–1.27)
DEPRECATED RDW RBC AUTO: 39 FL (ref 37–54)
EGFRCR SERPLBLD CKD-EPI 2021: 61.9 ML/MIN/1.73
EOSINOPHIL # BLD AUTO: 0.05 10*3/MM3 (ref 0–0.4)
EOSINOPHIL NFR BLD AUTO: 1.2 % (ref 0.3–6.2)
ERYTHROCYTE [DISTWIDTH] IN BLOOD BY AUTOMATED COUNT: 11.8 % (ref 12.3–15.4)
GLOBULIN UR ELPH-MCNC: 2.5 GM/DL
GLUCOSE SERPL-MCNC: 106 MG/DL (ref 65–99)
HCT VFR BLD AUTO: 44.8 % (ref 37.5–51)
HDLC SERPL-MCNC: 41 MG/DL (ref 40–60)
HGB BLD-MCNC: 15.6 G/DL (ref 13–17.7)
IMM GRANULOCYTES # BLD AUTO: 0.01 10*3/MM3 (ref 0–0.05)
IMM GRANULOCYTES NFR BLD AUTO: 0.2 % (ref 0–0.5)
LDLC SERPL CALC-MCNC: 136 MG/DL (ref 0–100)
LDLC/HDLC SERPL: 3.28 {RATIO}
LYMPHOCYTES # BLD AUTO: 1.16 10*3/MM3 (ref 0.7–3.1)
LYMPHOCYTES NFR BLD AUTO: 27.4 % (ref 19.6–45.3)
MCH RBC QN AUTO: 31.7 PG (ref 26.6–33)
MCHC RBC AUTO-ENTMCNC: 34.8 G/DL (ref 31.5–35.7)
MCV RBC AUTO: 91.1 FL (ref 79–97)
MONOCYTES # BLD AUTO: 0.38 10*3/MM3 (ref 0.1–0.9)
MONOCYTES NFR BLD AUTO: 9 % (ref 5–12)
NEUTROPHILS NFR BLD AUTO: 2.6 10*3/MM3 (ref 1.7–7)
NEUTROPHILS NFR BLD AUTO: 61.5 % (ref 42.7–76)
NRBC BLD AUTO-RTO: 0 /100 WBC (ref 0–0.2)
PLATELET # BLD AUTO: 211 10*3/MM3 (ref 140–450)
PMV BLD AUTO: 9.9 FL (ref 6–12)
POTASSIUM SERPL-SCNC: 5.3 MMOL/L (ref 3.5–5.2)
PROT SERPL-MCNC: 7 G/DL (ref 6–8.5)
RBC # BLD AUTO: 4.92 10*6/MM3 (ref 4.14–5.8)
SODIUM SERPL-SCNC: 140 MMOL/L (ref 136–145)
TRIGL SERPL-MCNC: 82 MG/DL (ref 0–150)
TSH SERPL DL<=0.05 MIU/L-ACNC: 1.15 UIU/ML (ref 0.27–4.2)
VLDLC SERPL-MCNC: 15 MG/DL (ref 5–40)
WBC NRBC COR # BLD AUTO: 4.23 10*3/MM3 (ref 3.4–10.8)

## 2025-06-03 PROCEDURE — 80050 GENERAL HEALTH PANEL: CPT

## 2025-06-03 PROCEDURE — 36415 COLL VENOUS BLD VENIPUNCTURE: CPT

## 2025-06-03 PROCEDURE — 80061 LIPID PANEL: CPT

## 2025-06-03 PROCEDURE — 99214 OFFICE O/P EST MOD 30 MIN: CPT | Performed by: NURSE PRACTITIONER

## 2025-06-03 RX ORDER — MELOXICAM 15 MG/1
15 TABLET ORAL DAILY
Qty: 90 TABLET | Refills: 1 | Status: SHIPPED | OUTPATIENT
Start: 2025-06-03

## 2025-06-03 RX ORDER — FENOFIBRATE 145 MG/1
145 TABLET, FILM COATED ORAL DAILY
Qty: 90 TABLET | Refills: 1 | Status: SHIPPED | OUTPATIENT
Start: 2025-06-03

## 2025-06-03 RX ORDER — AMLODIPINE AND BENAZEPRIL HYDROCHLORIDE 5; 20 MG/1; MG/1
1 CAPSULE ORAL DAILY
Qty: 90 CAPSULE | Refills: 1 | Status: SHIPPED | OUTPATIENT
Start: 2025-06-03

## 2025-06-03 NOTE — PROGRESS NOTES
Chief Complaint  Follow-up, Hypertension, Hyperlipidemia, and Knee Pain (Chronic)    SUBJECTIVE  Davi Garcia presents to [facility]    Hypertension:  Patient is taking Amlodipine-Benazepril.  Patient's Blood Pressure in clinic today is 117/66.  Patient does monitor blood pressure at home with readings of 120s/70s-80s.  Patient denies chest pain, shortness of air, headache, flushing, abnormal swelling in feet/ankles.    Hyperlipidemia:  Patient is taking Fenofibrate, Fish Oil.  Patient denies nocturnal leg cramps, myalgias.  Patient attempts to maintain a diet low in fat and carbohydrates.    Chronic Knee Pain:  Patient is taking Meloxicam PRN with good pain control.    Patient is scheduled for prostate biopsy next month per Dr. Cooper.    History of Present Illness  Past Medical History:   Diagnosis Date    Colon polyp     Elevated PSA     High blood pressure     High cholesterol     Kidney stones     HX of      Family History   Problem Relation Age of Onset    Urolithiasis Father     Malig Hyperthermia Neg Hx     Colon cancer Neg Hx       Past Surgical History:   Procedure Laterality Date    COLONOSCOPY      COLONOSCOPY N/A 05/06/2022    Procedure: COLONOSCOPY with biopies and cold snares/ clip;  Surgeon: Jairo Copeland MD;  Location: [facility] ENDOSCOPY;  Service: General;  Laterality: N/A;  colon polyps     COLONOSCOPY N/A 11/10/2023    Procedure: COLONOSCOPY with cold snare polypectomy;  Surgeon: Jairo Copeland MD;  Location: [facility] ENDOSCOPY;  Service: General;  Laterality: N/A;  colon polyps    COLONOSCOPY N/A 08/08/2024    Procedure: COLONOSCOPY W/ POLYPECTOMY;  Surgeon: Jairo Copeland MD;  Location: [facility] ENDOSCOPY;  Service: General;  Laterality: N/A;  COLON POLYP, HEMORRHOIDS    VASECTOMY          Current Outpatient Medications:     amLODIPine-benazepril (LOTREL 5-20) 5-20 MG per capsule, Take 1 capsule by mouth Daily., Disp: 90 capsule, Rfl: 1     "fenofibrate (Tricor) 145 MG tablet, Take 1 tablet by mouth Daily., Disp: 90 tablet, Rfl: 1    meloxicam (MOBIC) 15 MG tablet, Take 1 tablet by mouth Daily., Disp: 90 tablet, Rfl: 1    Omega-3 Fatty Acids (fish oil) 1200 MG capsule capsule, 1 capsule Every Night., Disp: , Rfl:     sodium-potassium-magnesium sulfates (Suprep Bowel Prep Kit) 17.5-3.13-1.6 GM/177ML solution oral solution, Take as directed.  Instructions given in office.  Dispense: 2 bottles (Patient not taking: Reported on 6/3/2025), Disp: 354 mL, Rfl: 0    OBJECTIVE  Vital Signs:   /66 (BP Location: Left arm, Patient Position: Sitting, Cuff Size: Large Adult)   Pulse 54   Temp 98.2 °F (36.8 °C) (Temporal)   Ht 185.4 cm (73\")   Wt 108 kg (237 lb 9.6 oz)   SpO2 98%   BMI 31.35 kg/m²    Estimated body mass index is 31.35 kg/m² as calculated from the following:    Height as of this encounter: 185.4 cm (73\").    Weight as of this encounter: 108 kg (237 lb 9.6 oz).     Wt Readings from Last 3 Encounters:   06/03/25 108 kg (237 lb 9.6 oz)   04/09/25 111 kg (244 lb)   03/04/25 111 kg (244 lb)     BP Readings from Last 3 Encounters:   06/03/25 117/66   02/18/25 140/84   11/12/24 116/72       Physical Exam  Vitals reviewed.   Constitutional:       Appearance: Normal appearance. He is well-developed.   HENT:      Head: Normocephalic and atraumatic.      Right Ear: External ear normal.      Left Ear: External ear normal.      Mouth/Throat:      Pharynx: No oropharyngeal exudate.   Eyes:      Conjunctiva/sclera: Conjunctivae normal.      Pupils: Pupils are equal, round, and reactive to light.   Cardiovascular:      Rate and Rhythm: Normal rate and regular rhythm.      Pulses: Normal pulses.      Heart sounds: Normal heart sounds. No murmur heard.     No friction rub. No gallop.   Pulmonary:      Effort: Pulmonary effort is normal.      Breath sounds: Normal breath sounds. No wheezing or rhonchi.   Skin:     General: Skin is warm and dry.   Neurological: "      Mental Status: He is alert and oriented to person, place, and time.      Cranial Nerves: No cranial nerve deficit.   Psychiatric:         Mood and Affect: Mood and affect normal.         Behavior: Behavior normal.         Thought Content: Thought content normal.         Judgment: Judgment normal.          Result Review        MRI Prostate With & Without Contrast  Result Date: 4/7/2025  Impression: 1. PI-RADS 4 peripheral zone lesion measuring 13 mm at left posterolateral base. Lesion abuts the capsule without jorge evidence of EPE. 2. PI-RADS 3 lesion involving right mid gland transition zone measuring 8 mm. 3. No pelvic lymphadenopathy. 4. No suspicious osseous lesion. Severe right hip osteoarthritis. Overall PI-RADS 4 exam. Electronically Signed: Luisito Mejía MD  4/7/2025 1:49 PM EDT  Workstation ID: [id number]        The above data has been reviewed by YAMILET Page 06/03/2025 09:15 EDT.          Patient Care Team:  Carmela Willis APRN as PCP - General (Family Medicine)  Padmini Guaman APRN as Nurse Practitioner (Nurse Practitioner)    BMI is >= 30 and <35. (Class 1 Obesity). The following options were offered after discussion;: weight loss educational material (shared in after visit summary), exercise counseling/recommendations, and nutrition counseling/recommendations       ASSESSMENT & PLAN    Diagnoses and all orders for this visit:    1. Mixed hyperlipidemia (Primary)  Comments:  Continue medication and check lab  Orders:  -     fenofibrate (Tricor) 145 MG tablet; Take 1 tablet by mouth Daily.  Dispense: 90 tablet; Refill: 1    2. Essential hypertension  Comments:  b/p well controlled, cont current medications.  Orders:  -     amLODIPine-benazepril (LOTREL 5-20) 5-20 MG per capsule; Take 1 capsule by mouth Daily.  Dispense: 90 capsule; Refill: 1  -     Comprehensive Metabolic Panel; Future  -     CBC & Differential; Future  -     Lipid Panel; Future  -     TSH Rfx On Abnormal To Free T4;  Future    3. Chronic pain of right knee  Comments:  Continue Mobic as needed  Orders:  -     meloxicam (MOBIC) 15 MG tablet; Take 1 tablet by mouth Daily.  Dispense: 90 tablet; Refill: 1         Tobacco Use: Low Risk  (6/3/2025)    Patient History     Smoking Tobacco Use: Never     Smokeless Tobacco Use: Never     Passive Exposure: Not on file       Follow Up     Return in about 6 months (around 12/3/2025).      Patient was given instructions and counseling regarding his condition or for health maintenance advice. Please see specific information pulled into the AVS if appropriate.   I have reviewed information obtained and documented by others and I have confirmed the accuracy of this documented note.    YAMILET Page

## 2025-06-04 ENCOUNTER — RESULTS FOLLOW-UP (OUTPATIENT)
Dept: LAB | Facility: HOSPITAL | Age: 65
End: 2025-06-04
Payer: COMMERCIAL

## 2025-06-04 DIAGNOSIS — E87.5 HYPERKALEMIA: Primary | ICD-10-CM

## 2025-06-04 NOTE — TELEPHONE ENCOUNTER
Patient informed and voiced understanding of lab results.      Call patient-potassium level slightly elevated, recommend patient decrease potassium rich foods in diet and recheck in 1 month. Order has been placed.  Lipid panel improved, continue medication and diet control.  Otherwise normal, stable labs.  Lipid Panel; Comprehensive Metabolic Panel; TSH Rfx On Abnormal To Free T4; CBC Auto Differential

## 2025-06-24 NOTE — PRE-PROCEDURE INSTRUCTIONS
IMPORTANT INSTRUCTIONS - PRE-ADMISSION TESTING  DO NOT EAT OR CHEW anything after midnight the night before your procedure.    NPO AFTER MN EXCEPT SIPS OF WATER TO TAKE MEDICATIONS LISTED BELOW  Take the following medications the morning of your procedure with JUST A SIP OF WATER:  NONE    DO NOT BRING your medications to the hospital with you, UNLESS something has changed since your PRE-Admission Testing appointment.  Hold all vitamins, supplements, and NSAIDS (Non- steroidal anti-inflammatory meds) for one week prior to surgery (you MAY take Tylenol or Acetaminophen).  If you are diabetic, check your blood sugar the morning of your procedure. If it is less than 70 or if you are feeling symptomatic, call the following number for further instructions: [phone]  Use your inhalers/nebulizers as usual, the morning of your procedure. BRING YOUR INHALERS with you.   Bring your CPAP or BIPAP to hospital, ONLY IF YOU WILL BE SPENDING THE NIGHT.   Make sure you have a ride home and have someone who will stay with you the day of your procedure after you go home.  If you have any questions, please call your Pre-Admission Testing Nurse, ___RENAY_____________ at [phone]____________.   Per anesthesia request, do not smoke for 24 hours before your procedure or as instructed by your surgeon.    WILL CALL ON 6/24/25        NORMALLY BETWEEN 1 AND 4 PM TO GIVE OFFICIAL ARRIVAL TIME FOR DAY OF PROCEDURE  BATHING INSTRUCTIONS GIVEN. NO JEWELRY OF ANY TYPE DAY OF PROCEDURE  COME TO [facility] [address] DAY OF PROCEDURE. GET ON ELEVATOR AND COME TO FIRST FLOOR TAKE LEFT OFF OF ELEVATOR.    WEAR GLASSES DAY OF PROCEDURE DO NOT WANT YOU GOING BACK WITH CONTACTS IN

## 2025-06-25 ENCOUNTER — ANESTHESIA EVENT (OUTPATIENT)
Dept: PERIOP | Facility: HOSPITAL | Age: 65
End: 2025-06-25
Payer: MEDICAID

## 2025-06-26 ENCOUNTER — TELEPHONE (OUTPATIENT)
Dept: GASTROENTEROLOGY | Facility: CLINIC | Age: 65
End: 2025-06-26
Payer: MEDICAID

## 2025-06-26 ENCOUNTER — ANESTHESIA (OUTPATIENT)
Dept: PERIOP | Facility: HOSPITAL | Age: 65
End: 2025-06-26
Payer: MEDICAID

## 2025-06-26 ENCOUNTER — HOSPITAL ENCOUNTER (OUTPATIENT)
Facility: HOSPITAL | Age: 65
Setting detail: HOSPITAL OUTPATIENT SURGERY
Discharge: HOME | End: 2025-06-26
Attending: UROLOGY | Admitting: UROLOGY
Payer: MEDICAID

## 2025-06-26 VITALS
HEART RATE: 47 BPM | RESPIRATION RATE: 16 BRPM | WEIGHT: 237.44 LBS | HEIGHT: 73 IN | OXYGEN SATURATION: 98 % | TEMPERATURE: 98 F | DIASTOLIC BLOOD PRESSURE: 68 MMHG | BODY MASS INDEX: 31.47 KG/M2 | SYSTOLIC BLOOD PRESSURE: 111 MMHG

## 2025-06-26 DIAGNOSIS — R93.89 ABNORMAL MRI: ICD-10-CM

## 2025-06-26 DIAGNOSIS — R97.20 ELEVATED PSA: ICD-10-CM

## 2025-06-26 LAB
ANION GAP SERPL CALCULATED.3IONS-SCNC: 10.1 MMOL/L (ref 5–15)
BUN SERPL-MCNC: 33.1 MG/DL (ref 8–23)
BUN/CREAT SERPL: 26.5 (ref 7–25)
CALCIUM SPEC-SCNC: 9 MG/DL (ref 8.6–10.5)
CHLORIDE SERPL-SCNC: 106 MMOL/L (ref 98–107)
CO2 SERPL-SCNC: 22.9 MMOL/L (ref 22–29)
CREAT SERPL-MCNC: 1.25 MG/DL (ref 0.76–1.27)
EGFRCR SERPLBLD CKD-EPI 2021: 64.3 ML/MIN/1.73
GLUCOSE SERPL-MCNC: 114 MG/DL (ref 65–99)
POTASSIUM SERPL-SCNC: 4.3 MMOL/L (ref 3.5–5.2)
SODIUM SERPL-SCNC: 139 MMOL/L (ref 136–145)

## 2025-06-26 PROCEDURE — 25010000002 MIDAZOLAM PER 1MG: Performed by: ANESTHESIOLOGY

## 2025-06-26 PROCEDURE — 25010000002 LIDOCAINE PF 2% 2 % SOLUTION

## 2025-06-26 PROCEDURE — 88305 TISSUE EXAM BY PATHOLOGIST: CPT | Performed by: UROLOGY

## 2025-06-26 PROCEDURE — 80048 BASIC METABOLIC PNL TOTAL CA: CPT | Performed by: ANESTHESIOLOGY

## 2025-06-26 PROCEDURE — 25010000002 PROPOFOL 10 MG/ML EMULSION

## 2025-06-26 PROCEDURE — 76942 ECHO GUIDE FOR BIOPSY: CPT | Performed by: UROLOGY

## 2025-06-26 PROCEDURE — S0260 H&P FOR SURGERY: HCPCS | Performed by: UROLOGY

## 2025-06-26 PROCEDURE — 55700 PR PROSTATE NEEDLE BIOPSY ANY APPROACH: CPT | Performed by: UROLOGY

## 2025-06-26 PROCEDURE — 25010000002 CEFOXITIN PER 1 G: Performed by: NURSE PRACTITIONER

## 2025-06-26 PROCEDURE — 25810000003 LACTATED RINGERS PER 1000 ML: Performed by: ANESTHESIOLOGY

## 2025-06-26 RX ORDER — SODIUM CHLORIDE 9 MG/ML
40 INJECTION, SOLUTION INTRAVENOUS AS NEEDED
Status: DISCONTINUED | OUTPATIENT
Start: 2025-06-26 | End: 2025-06-26 | Stop reason: HOSPADM

## 2025-06-26 RX ORDER — SODIUM CHLORIDE, SODIUM LACTATE, POTASSIUM CHLORIDE, CALCIUM CHLORIDE 600; 310; 30; 20 MG/100ML; MG/100ML; MG/100ML; MG/100ML
20 INJECTION, SOLUTION INTRAVENOUS CONTINUOUS PRN
Status: DISCONTINUED | OUTPATIENT
Start: 2025-06-26 | End: 2025-06-26 | Stop reason: HOSPADM

## 2025-06-26 RX ORDER — PROPOFOL 10 MG/ML
VIAL (ML) INTRAVENOUS AS NEEDED
Status: DISCONTINUED | OUTPATIENT
Start: 2025-06-26 | End: 2025-06-26 | Stop reason: SURG

## 2025-06-26 RX ORDER — ACETAMINOPHEN 500 MG
1000 TABLET ORAL ONCE
Status: COMPLETED | OUTPATIENT
Start: 2025-06-26 | End: 2025-06-26

## 2025-06-26 RX ORDER — ONDANSETRON 2 MG/ML
4 INJECTION INTRAMUSCULAR; INTRAVENOUS ONCE AS NEEDED
Status: DISCONTINUED | OUTPATIENT
Start: 2025-06-26 | End: 2025-06-26 | Stop reason: HOSPADM

## 2025-06-26 RX ORDER — PROMETHAZINE HYDROCHLORIDE 25 MG/1
25 SUPPOSITORY RECTAL ONCE AS NEEDED
Status: DISCONTINUED | OUTPATIENT
Start: 2025-06-26 | End: 2025-06-26 | Stop reason: HOSPADM

## 2025-06-26 RX ORDER — MIDAZOLAM HYDROCHLORIDE 2 MG/2ML
2 INJECTION, SOLUTION INTRAMUSCULAR; INTRAVENOUS ONCE
Status: COMPLETED | OUTPATIENT
Start: 2025-06-26 | End: 2025-06-26

## 2025-06-26 RX ORDER — ACETAMINOPHEN 325 MG/1
650 TABLET ORAL ONCE
Status: DISCONTINUED | OUTPATIENT
Start: 2025-06-26 | End: 2025-06-26

## 2025-06-26 RX ORDER — LIDOCAINE HYDROCHLORIDE 20 MG/ML
INJECTION, SOLUTION EPIDURAL; INFILTRATION; INTRACAUDAL; PERINEURAL AS NEEDED
Status: DISCONTINUED | OUTPATIENT
Start: 2025-06-26 | End: 2025-06-26 | Stop reason: SURG

## 2025-06-26 RX ORDER — OXYCODONE HYDROCHLORIDE 5 MG/1
5 TABLET ORAL
Status: DISCONTINUED | OUTPATIENT
Start: 2025-06-26 | End: 2025-06-26 | Stop reason: HOSPADM

## 2025-06-26 RX ORDER — PROMETHAZINE HYDROCHLORIDE 25 MG/1
25 TABLET ORAL ONCE AS NEEDED
Status: DISCONTINUED | OUTPATIENT
Start: 2025-06-26 | End: 2025-06-26 | Stop reason: HOSPADM

## 2025-06-26 RX ORDER — PROMETHAZINE HYDROCHLORIDE 12.5 MG/1
12.5 TABLET ORAL ONCE AS NEEDED
Status: DISCONTINUED | OUTPATIENT
Start: 2025-06-26 | End: 2025-06-26 | Stop reason: HOSPADM

## 2025-06-26 RX ORDER — SODIUM CHLORIDE 0.9 % (FLUSH) 0.9 %
3 SYRINGE (ML) INJECTION EVERY 12 HOURS SCHEDULED
Status: DISCONTINUED | OUTPATIENT
Start: 2025-06-26 | End: 2025-06-26 | Stop reason: HOSPADM

## 2025-06-26 RX ORDER — IBUPROFEN 600 MG/1
600 TABLET, FILM COATED ORAL EVERY 6 HOURS PRN
Status: DISCONTINUED | OUTPATIENT
Start: 2025-06-26 | End: 2025-06-26 | Stop reason: HOSPADM

## 2025-06-26 RX ORDER — SODIUM CHLORIDE 0.9 % (FLUSH) 0.9 %
10 SYRINGE (ML) INJECTION AS NEEDED
Status: DISCONTINUED | OUTPATIENT
Start: 2025-06-26 | End: 2025-06-26 | Stop reason: HOSPADM

## 2025-06-26 RX ADMIN — ACETAMINOPHEN 1000 MG: 500 TABLET ORAL at 07:47

## 2025-06-26 RX ADMIN — PROPOFOL 200 MCG/KG/MIN: 10 INJECTION, EMULSION INTRAVENOUS at 08:46

## 2025-06-26 RX ADMIN — SODIUM CHLORIDE, POTASSIUM CHLORIDE, SODIUM LACTATE AND CALCIUM CHLORIDE 20 ML/HR: 600; 310; 30; 20 INJECTION, SOLUTION INTRAVENOUS at 07:52

## 2025-06-26 RX ADMIN — LIDOCAINE HYDROCHLORIDE 80 MG: 20 INJECTION, SOLUTION EPIDURAL; INFILTRATION; INTRACAUDAL; PERINEURAL at 08:45

## 2025-06-26 RX ADMIN — PROPOFOL 20 MG: 10 INJECTION, EMULSION INTRAVENOUS at 08:48

## 2025-06-26 RX ADMIN — SODIUM CHLORIDE 2 G: 9 INJECTION, SOLUTION INTRAVENOUS at 08:47

## 2025-06-26 RX ADMIN — MIDAZOLAM HYDROCHLORIDE 2 MG: 1 INJECTION, SOLUTION INTRAMUSCULAR; INTRAVENOUS at 08:34

## 2025-06-26 RX ADMIN — PROPOFOL 120 MG: 10 INJECTION, EMULSION INTRAVENOUS at 08:45

## 2025-06-26 NOTE — H&P
Chief Complaint: No chief complaint on file.      Subjective         History of Present Illness  Davi Garcia is a 65 y.o. male presents to [facility] OR to be seen for follow-up.    The patient was previously seen on 3/4/2025 for elevated PSA and BPH with nocturia at that time, the patient was scheduled for MRI of the prostate.  He was not overly bothered by his BPH symptoms and did not desire treatment for that at that visit.  He is here today to follow-up.    MRI of the prostate did show a PI-RADS 4 lesion measuring 13 mm at the left posterolateral base and a PI-RADS 3 lesion involving right mid gland transition zone measuring 8 mm.  PSA density calculated using most recent PSA of 6.35 and prostate size of 38 cc is 0.17.    The patient denies any bothersome urinary symptoms at this time.    MRI PROSTATE W WO CONTRAST  Date of Exam: 4/2/2025 8:00 AM EDT     Indication: Elevated PSA. 6.35     Comparison: MRI prostate 2/26/2021     Technique: Multiparametric 3T MRI of the pelvis was obtained prior to and after the uneventful administration of Multihance. Imaging was performed per prostate protocol.     Advanced 3D workstation manipulation and review of the data set was performed by the interpreting physician to further define anatomy and possible pathology. Images of areas of interest were created utilizing various techniques. These images were saved   and transferred to PACS if significant.     Findings:  Prostate size: 4.5 [CC] x 3.8 [AP] x 4.7 [transverse] cm for an overall volume of 38 cc.  PSAD = 0.16     Image quality adequate.     Hemorrhage: None.     Peripheral Zone: Left peripheral zone lesion at the base described below. Additional areas of scattered heterogeneously hypointense signal throughout the peripheral zone suggesting sequela of prostatitis with similar configuration to the prior study.     Transition Zone: Heterogeneous transition zone with BPH nodules. Right transition zone  lesion below     Lesion 1  PI-RADS category: 4, High probability  T2: 4, circumscribed, homogeneous moderate hypointense focus/mass confined to prostate and <1.5 cm in greatest dimension  DWI: 4, focal markedly hypointense on ADC and markedly hyperintense on high b value DWI; <1.5 cm in greatest dimension  DCE: Positive  Size: 13 x 7 mm, series 3 image 14-16 (series 6 image 16)  Side: Left.  Zone: Peripheral  Level of prostate: Base  Location within transverse plane: Posterolateral  Extraprostatic extension: Abuts the prostatic capsule     Lesion 2  PI-RADS category: 3, Intermediate probability  T2: 3, heterogeneous signal intensity or non-circumscribed, rounded, moderate hypointensity  DWI: 3, focal mildly/moderately hypointense on ADC and isointense/mildly hyperintense on high b value DWI  DCE: Negative - No early enhancement  Size: 8 x 7 mm, series 3 image 20-22 (series 6 image 22-23)  Side: Right.  Zone: Transition  Level of prostate: [place]  Location within transverse plane: Anterior  Extraprostatic extension: Does not abut the prostatic capsule     Seminal vesicles: No acute abnormality..     Lymph nodes: No pelvic lymphadenopathy.     Osseous structures: No aggressive osseous lesion. Severe right and moderate left hip osteoarthritis. Right hip joint effusion.     Additional findings: Bladder wall thickening which may relate to underdistention and/or chronic outlet obstruction. No acute abnormality of the rectosigmoid colon. No free fluid in the pelvis.     IMPRESSION:  Impression:  1. PI-RADS 4 peripheral zone lesion measuring 13 mm at left posterolateral base. Lesion abuts the capsule without jorge evidence of EPE.  2. PI-RADS 3 lesion involving right mid gland transition zone measuring 8 mm.  3. No pelvic lymphadenopathy.  4. No suspicious osseous lesion. Severe right hip osteoarthritis.     Overall PI-RADS 4 exam.        Electronically Signed: Luisito Mejía MD    4/7/2025 1:49 PM EDT       Previous visit  3/4/2025:  Davi Garcia is a 65 y.o. male presents to [facility] to be seen for elevated PSA.     The patient was previously seen in our office by Dr. Svetlana Cooper with last office visit on 2021 for bladder wall thickening.  He underwent cystoscopy at that time.  The patient did have an MRI of the prostate completed on 2021 which showed BPH and chronic prostatitis.  There were no suspicious lesions suggestive of possible malignancy on that MRI.  The patient is here today to follow-up.     The patient has had an elevated PSA for many years.  His PSA has been slowly increasing.  He denies any bothersome urinary symptoms or changes in his urinary habits.      Frequency-denies      Urgency-sometimes      Incontinence-denies      Nocturia-admits, 1-2 X per night      Dysuria-denies      Perineal pain-denies      Stream-good      GH-denies      History of stones-admits, always been able to pass the stones, has not passed a stone in over 4 years       surgeries-denies      Family history of  malignancy-denies      Cardiopulmonary-HTN     Anticoagulants-none      Smoker-denies      PSA  2025 6.35  2024 6.78  2024 6.72  3/6/2023 6.3  3/1/2022 6.27  2021 4.94  2021 5.71     MRI Prostate W & WO Contrast  Order: [id number]  Narrative     RADIOLOGY REPORT    FACILITY:  [facility]  UNIT/AGE/GENDER: HAZEL  OP      AGE:61 Y          SEX:M  PATIENT NAME/:  DAVI GARCIA    1960  UNIT NUMBER:  [id number]  ACCOUNT NUMBER:  [id number]  ACCESSION NUMBER:  [id number]    MRI of the prostate with and without contrast dated 2021.    INDICATION: Elevated PSA. No previous biopsy.    TECHNIQUE: Multi parametric prostate protocol with high-resolution T1 and T2 weighted sequences, precontrast, diffusion weighted imaging and multiphase dynamic gadolinium enhanced gradient echo imaging during contrast administration. Post  processing  includes construction of 3-D prostate volume and 3-D regions of interest for potential biopsy when applicable, performed on a separate workstation by the radiologist.    COMPARISON: None    FINDINGS: Motion artifact degrades image quality.    Prostate gland measures 4.6 x 3.7 x 3.9 cm for a total prostate volume of 33.38 mL.    Heterogeneous signal intensity and enhancement in the mildly enlarged central gland is related to BPH.    There is hazy and streaky decreased T2 signal intensity in the bilateral peripheral zones without significant restricted diffusion and favored to be related to chronic prostatitis.    Allowing for motion, there is no suspicious T2 localizing lesion to suggest significant prostate malignancy.    Thickening of the wall of the urinary bladder which is not well-distended and may be exaggerated by under distention. Cystitis or outlet obstruction is not excluded. Seminal vesicles are symmetric.    Pelvic bowel loops are unremarkable. No ascites or significant lymphadenopathy. There are degenerative changes with no suspicious osseous lesion. Mild fluid and enhancement surrounding the right hip may be synovitis or bursitis.    IMPRESSION:  1. No suspicious T2 localizing lesion to suggest significant prostate malignancy.  2. BPH.  3. Chronic prostatitis.    Dictated by: Cheyenne Santana M.D.    Images and Report reviewed and interpreted by: Cheyenne Santana M.D.    <PS><Electronically signed by: Cheyenne Santana M.D.>  03/01/2021 0810    D: 03/01/2021 0756  T: 03/01/2021 0756       Exam End: 02/26/21 21:30     Specimen Collected: 03/01/21 07:56 Last Resulted: 03/01/21 08:11   Received From: [employer]  Result Received: 01/02/24 08:41            Objective     Past Medical History:   Diagnosis Date    Arthritis     RIGHT HIP    Colon polyp     Elevated PSA     Elevated serum creatinine     1.29 ON 6/3/25    High blood pressure     High cholesterol     Hyperkalemia     WAS 5.7 ON  11/12/24 WITH LAST READING BEING 5.3 ON 6/3/25.  PCP HAD SPOKE WITH PT IN REGARDS TO DECREASING POTASSIUM RICH FOOD FROM DIET AND PLANS ON RECHECKING CMP IN 1 MONTH    Kidney stones     HISTORY OF REPORTS HAS PASSED ON HIS OWN NO SURGICAL INTERVENTION       Past Surgical History:   Procedure Laterality Date    COLONOSCOPY      COLONOSCOPY N/A 05/06/2022    Procedure: COLONOSCOPY with biopies and cold snares/ clip;  Surgeon: Jairo Copeland MD;  Location: [facility] ENDOSCOPY;  Service: General;  Laterality: N/A;  colon polyps     COLONOSCOPY N/A 11/10/2023    Procedure: COLONOSCOPY with cold snare polypectomy;  Surgeon: Jairo Copeland MD;  Location: [facility] ENDOSCOPY;  Service: General;  Laterality: N/A;  colon polyps    COLONOSCOPY N/A 08/08/2024    Procedure: COLONOSCOPY W/ POLYPECTOMY;  Surgeon: Jairo Copeland MD;  Location: [facility] ENDOSCOPY;  Service: General;  Laterality: N/A;  COLON POLYP, HEMORRHOIDS    VASECTOMY           Current Facility-Administered Medications:     acetaminophen (TYLENOL) tablet 1,000 mg, 1,000 mg, Oral, Once, Michael Joyce MD    cefOXItin (MEFOXIN) 2 g in sodium chloride 0.9 % 100 mL IVPB-VTB, 2 g, Intravenous, Once, Ashley Short APRN    lactated ringers infusion, 20 mL/hr, Intravenous, Continuous PRN, Michael Joyce MD    Midazolam HCl (PF) (VERSED) injection 2 mg, 2 mg, Intravenous, Once, Michael Joyce MD    sodium chloride 0.9 % flush 10 mL, 10 mL, Intravenous, PRN, Ashley Short APRN    sodium chloride 0.9 % flush 3 mL, 3 mL, Intravenous, Q12H, Ashley Short APRN    sodium chloride 0.9 % infusion 40 mL, 40 mL, Intravenous, PRN, Ashley Short APRN    Allergies   Allergen Reactions    Statins Myalgia     Leg cramps    Zetia [Ezetimibe] Myalgia        Family History   Problem Relation Age of Onset    Urolithiasis Father     Malig Hyperthermia Neg Hx     Colon cancer Neg Hx        Social History     Socioeconomic History    Marital status:  "   Tobacco Use    Smoking status: Never    Smokeless tobacco: Never   Vaping Use    Vaping status: Never Used   Substance and Sexual Activity    Alcohol use: Yes     Alcohol/week: 1.0 standard drink of alcohol     Types: 1 Cans of beer per week     Comment: Current some day  drinks rarely; beer    Drug use: Never    Sexual activity: Defer       Vital Signs:   /83 (BP Location: Left arm, Patient Position: Sitting)   Pulse 58   Temp 98.2 °F (36.8 °C) (Temporal)   Resp 18   Ht 185.4 cm (73\")   Wt 108 kg (237 lb 7 oz)   SpO2 98%   BMI 31.33 kg/m²      Physical Exam  Vitals and nursing note reviewed.   Constitutional:       General: He is not in acute distress.     Appearance: Normal appearance. He is not toxic-appearing.   Pulmonary:      Effort: Pulmonary effort is normal. No respiratory distress.   Neurological:      General: No focal deficit present.      Mental Status: He is alert and oriented to person, place, and time.          Result Review :   The following data was reviewed by: Svetlana Cooper MD on 04/09/2025:  Results for orders placed or performed in visit on 06/03/25   Comprehensive Metabolic Panel    Collection Time: 06/03/25  9:41 AM    Specimen: Blood   Result Value Ref Range    Glucose 106 (H) 65 - 99 mg/dL    BUN 29.0 (H) 8.0 - 23.0 mg/dL    Creatinine 1.29 (H) 0.76 - 1.27 mg/dL    Sodium 140 136 - 145 mmol/L    Potassium 5.3 (H) 3.5 - 5.2 mmol/L    Chloride 106 98 - 107 mmol/L    CO2 24.9 22.0 - 29.0 mmol/L    Calcium 10.0 8.6 - 10.5 mg/dL    Total Protein 7.0 6.0 - 8.5 g/dL    Albumin 4.5 3.5 - 5.2 g/dL    ALT (SGPT) 20 1 - 41 U/L    AST (SGOT) 22 1 - 40 U/L    Alkaline Phosphatase 55 39 - 117 U/L    Total Bilirubin 0.4 0.0 - 1.2 mg/dL    Globulin 2.5 gm/dL    A/G Ratio 1.8 g/dL    BUN/Creatinine Ratio 22.5 7.0 - 25.0    Anion Gap 9.1 5.0 - 15.0 mmol/L    eGFR 61.9 >60.0 mL/min/1.73   Lipid Panel    Collection Time: 06/03/25  9:41 AM    Specimen: Blood   Result Value Ref Range "    Total Cholesterol 192 0 - 200 mg/dL    Triglycerides 82 0 - 150 mg/dL    HDL Cholesterol 41 40 - 60 mg/dL    LDL Cholesterol  136 (H) 0 - 100 mg/dL    VLDL Cholesterol 15 5 - 40 mg/dL    LDL/HDL Ratio 3.28    TSH Rfx On Abnormal To Free T4    Collection Time: 06/03/25  9:41 AM    Specimen: Blood   Result Value Ref Range    TSH 1.150 0.270 - 4.200 uIU/mL   CBC Auto Differential    Collection Time: 06/03/25  9:41 AM    Specimen: Blood   Result Value Ref Range    WBC 4.23 3.40 - 10.80 10*3/mm3    RBC 4.92 4.14 - 5.80 10*6/mm3    Hemoglobin 15.6 13.0 - 17.7 g/dL    Hematocrit 44.8 37.5 - 51.0 %    MCV 91.1 79.0 - 97.0 fL    MCH 31.7 26.6 - 33.0 pg    MCHC 34.8 31.5 - 35.7 g/dL    RDW 11.8 (L) 12.3 - 15.4 %    RDW-SD 39.0 37.0 - 54.0 fl    MPV 9.9 6.0 - 12.0 fL    Platelets 211 140 - 450 10*3/mm3    Neutrophil % 61.5 42.7 - 76.0 %    Lymphocyte % 27.4 19.6 - 45.3 %    Monocyte % 9.0 5.0 - 12.0 %    Eosinophil % 1.2 0.3 - 6.2 %    Basophil % 0.7 0.0 - 1.5 %    Immature Grans % 0.2 0.0 - 0.5 %    Neutrophils, Absolute 2.60 1.70 - 7.00 10*3/mm3    Lymphocytes, Absolute 1.16 0.70 - 3.10 10*3/mm3    Monocytes, Absolute 0.38 0.10 - 0.90 10*3/mm3    Eosinophils, Absolute 0.05 0.00 - 0.40 10*3/mm3    Basophils, Absolute 0.03 0.00 - 0.20 10*3/mm3    Immature Grans, Absolute 0.01 0.00 - 0.05 10*3/mm3    nRBC 0.0 0.0 - 0.2 /100 WBC      PSA          11/12/2024    12:04 2/21/2025    06:47   PSA   PSA 6.780  6.350              Procedures        Assessment and Plan    Diagnoses and all orders for this visit:    1. Abnormal MRI  -     sodium chloride 0.9 % flush 3 mL  -     sodium chloride 0.9 % flush 10 mL  -     sodium chloride 0.9 % infusion 40 mL  -     cefOXItin (MEFOXIN) 2 g in sodium chloride 0.9 % 100 mL IVPB-VTB    2. Elevated PSA  -     sodium chloride 0.9 % flush 3 mL  -     sodium chloride 0.9 % flush 10 mL  -     sodium chloride 0.9 % infusion 40 mL  -     cefOXItin (MEFOXIN) 2 g in sodium chloride 0.9 % 100 mL  IVPB-VTB    Other orders  -     Basic Metabolic Panel; Standing  -     Basic Metabolic Panel  -     Vital Signs - Per Anesthesia Protocol; Standing  -     Remove Scopolamine Patch 24 Hours After Application; Standing  -     Continuous Pulse Oximetry; Standing  -     Insert Peripheral IV; Standing  -     lactated ringers infusion  -     acetaminophen (TYLENOL) tablet 1,000 mg  -     Midazolam HCl (PF) (VERSED) injection 2 mg  -     Remove Scopolamine Patch 24 Hours After Application  -     Continuous Pulse Oximetry  -     Insert Peripheral IV  -     Follow Anesthesia Guidelines / Protocol; Standing  -     Verify / Perform Chlorhexidine Skin Prep; Standing  -     Insert Peripheral IV; Standing  -     Saline Lock & Maintain IV Access; Standing  -     Place Sequential Compression Device; Standing  -     Maintain Sequential Compression Device; Standing  -     Follow Anesthesia Guidelines / Protocol  -     Verify / Perform Chlorhexidine Skin Prep  -     Insert Peripheral IV  -     Saline Lock & Maintain IV Access  -     Place Sequential Compression Device  -     Maintain Sequential Compression Device    MRI results were reviewed in detail with the patient.  It is my recommendation that the patient proceed with MRI fusion biopsy of the prostate.  I did offer him dates for this procedure with Dr. Svetlana Cooper of 4/24/2025 and 5/28/2025.  We did discuss the potential risk and benefit of the procedure in detail.  The patient would like to talk with his wife about this before he makes any definite decision and will call me tomorrow to let me know if he would like to move forward with biopsy.  We did discuss that if he so chose to not proceed with biopsy there is a possibility that we could be missing a prostate malignancy that could potentially be life-threatening.  He verbalizes understanding and will get back with me tomorrow to let me know how he would like to proceed.    The patient continues to be unbothered by any  urinary symptoms and does not desire treatment for BPH at this time.      Follow Up   No follow-ups on file.  Patient was given instructions and counseling regarding his condition or for health maintenance advice. Please see specific information pulled into the AVS if appropriate.         This document has been electronically signed by Svetlana Cooper MD  June 26, 2025 07:35 EDT

## 2025-06-26 NOTE — ANESTHESIA POSTPROCEDURE EVALUATION
Patient: Davi Garcia    Procedure Summary       Date: 06/26/25 Room / Location: [facility] OR 09 / [facility] MAIN OR    Anesthesia Start: 0839 Anesthesia Stop: 0905    Procedure: PROSTATE ULTRASOUND BIOPSY MRI FUSION WITH URONAV  Ultrasound of the prostate through the rectum with biopsies of the prostate using MRI images Diagnosis:       Abnormal MRI      Elevated PSA      (Abnormal MRI [R93.89])      (Elevated PSA [R97.20])    Surgeons: Svetlana Cooper MD Provider: Michael Joyce MD    Anesthesia Type: general ASA Status: 3            Anesthesia Type: general    Vitals  Vitals Value Taken Time   /70 06/26/25 09:20   Temp 36.7 °C (98 °F) 06/26/25 09:21   Pulse 51 06/26/25 09:22   Resp 16 06/26/25 09:20   SpO2 98 % 06/26/25 09:22   Vitals shown include unfiled device data.        Post Anesthesia Care and Evaluation    Patient location during evaluation: bedside  Patient participation: complete - patient participated  Level of consciousness: awake  Pain score: 0  Pain management: adequate    Airway patency: patent  Anesthetic complications: No anesthetic complications  PONV Status: none  Cardiovascular status: acceptable and stable  Respiratory status: acceptable  Hydration status: acceptable

## 2025-06-26 NOTE — ANESTHESIA PREPROCEDURE EVALUATION
Anesthesia Evaluation     Patient summary reviewed and Nursing notes reviewed   no history of anesthetic complications:   NPO Solid Status: > 8 hours  NPO Liquid Status: > 2 hours           Airway   Mallampati: II  TM distance: >3 FB  Neck ROM: full  No difficulty expected  Dental      Pulmonary - normal exam    breath sounds clear to auscultation  (+) ,sleep apnea  Cardiovascular - normal exam  Exercise tolerance: good (4-7 METS)    Rhythm: regular  Rate: normal    (+) hypertension, hyperlipidemia      Neuro/Psych- negative ROS  GI/Hepatic/Renal/Endo    (+) renal disease- stones    Musculoskeletal     Abdominal    Substance History - negative use     OB/GYN negative ob/gyn ROS         Other   arthritis,     ROS/Med Hx Other: >4METS.HX HTN,HLD,HYPERKALEMIA. LAST K+ 6/3/25 5.3.PCP OV 6/3/25. KT           Latest Reference Range & Units 06/03/25 09:41   Sodium 136 - 145 mmol/L 140   Potassium 3.5 - 5.2 mmol/L 5.3 (H)   Chloride 98 - 107 mmol/L 106   CO2 22.0 - 29.0 mmol/L 24.9   Anion Gap 5.0 - 15.0 mmol/L 9.1   BUN 8.0 - 23.0 mg/dL 29.0 (H)   Creatinine 0.76 - 1.27 mg/dL 1.29 (H)   BUN/Creatinine Ratio 7.0 - 25.0  22.5   eGFR >60.0 mL/min/1.73 61.9   Glucose 65 - 99 mg/dL 106 (H)   Calcium 8.6 - 10.5 mg/dL 10.0   Alkaline Phosphatase 39 - 117 U/L 55   Total Protein 6.0 - 8.5 g/dL 7.0   Albumin 3.5 - 5.2 g/dL 4.5   Globulin gm/dL 2.5   A/G Ratio g/dL 1.8   AST (SGOT) 1 - 40 U/L 22   ALT (SGPT) 1 - 41 U/L 20   Total Bilirubin 0.0 - 1.2 mg/dL 0.4   (H): Data is abnormally high        Latest Reference Range & Units 06/03/25 09:41   Hemoglobin 13.0 - 17.7 g/dL 15.6   Hematocrit 37.5 - 51.0 % 44.8     > 4 mets, hx of htn, lipid, hyperkalemia, 6/3 5.3, bmp dos      Anesthesia Plan    ASA 3     general     (Patient understands anesthesia not responsible for dental damage.    Potassium elevated 6/3/25, will obtain bmp today ----  Potassium 4.3 today)  intravenous induction     Anesthetic plan, risks, benefits, and  alternatives have been provided, discussed and informed consent has been obtained with: patient.    Use of blood products discussed with patient .    Plan discussed with CRNA.      CODE STATUS:

## 2025-06-26 NOTE — OP NOTE
PROSTATE ULTRASOUND BIOPSY MRI FUSION WITH URONAV  Procedure Report    Patient Name:  Davi Garcia  YOB: 1960    Date of Surgery:  6/26/2025     Pre-op Diagnosis:   Abnormal MRI [R93.89]  Elevated PSA [R97.20]       Post-Op Diagnosis Codes:     * Abnormal MRI [R93.89]     * Elevated PSA [R97.20]      Procedure/CPT® Codes:    Procedure(s):  PROSTATE ULTRASOUND BIOPSY MRI FUSION WITH URONAV        Staff:  Surgeon(s):  Svetlana Cooper MD         Anesthesia: Sedation    Estimated Blood Loss: 1 mL    Implants:    Nothing was implanted during the procedure    Specimen:          Specimens       ID Source Type Tests Collected By Collected At Frozen?    A Prostate Tissue TISSUE PATHOLOGY EXAM   Svetlana Cooper MD 6/26/25 0803     Description: RIGHT PROSTATE X6    B Prostate Tissue TISSUE PATHOLOGY EXAM   Svetlana Cooper MD 6/26/25 0825     Description: LEFT PROSTATE X5    C Prostate Tissue TISSUE PATHOLOGY EXAM   Svetlana Cooper MD 6/26/25 0860     Description: REGION OF INTEREST 1 X3    D Prostate Tissue TISSUE PATHOLOGY EXAM   Svetlana Cooper MD 6/26/25 0817     Description: REGION OF INTEREST 2 X3                Complications: None    Description of Procedure:     An ultrasound probe was placed into the rectum and the prostate was inspected.  No obvious lesions were seen.    The prostate was then imaged and examined using the ultrasound and this was fused with his previous MRI of his prostate using the fusion software.      The right and left sides of the prostate were then biopsied randomly using the biopsy needle.  They were labeled as right and left prostate biopsy.  5 were taken on the left and 5 on the right.     At this point, the biopsies were taken of the regions of interest from the prostate.  These were sent to pathology and labeled as region of interest 1 and 2.      The patient tolerated this well.  The ultrasound probe was removed.  He was transferred to the PACU in stable  condition.             Svetlana Cooper MD     Date: 6/26/2025  Time: 09:22 EDT

## 2025-06-26 NOTE — TELEPHONE ENCOUNTER
ENDO RECONCILIATION  Verify source of procedure(s): Nurse/MA screening  If other, please list source: self referral  TIME OUT-CONFIRM CORRECT PROCEDURE: Colonoscopy  Cardiology:  Pulmonology:  Blood thinner:   GLP-1:  Additional DX/indication for procedure:     Please include any other notes relevant to endo reconciliation: N/A

## 2025-06-27 ENCOUNTER — TELEPHONE (OUTPATIENT)
Dept: UROLOGY | Age: 65
End: 2025-06-27
Payer: COMMERCIAL

## 2025-07-01 ENCOUNTER — RESULTS FOLLOW-UP (OUTPATIENT)
Dept: PERIOP | Facility: HOSPITAL | Age: 65
End: 2025-07-01

## 2025-07-01 NOTE — PROGRESS NOTES
Call patient one of the results of his prostate biopsy.  He had several biopsy cores positive for low-grade Dominique 6 prostate cancer.  We discussed this.  Questions answered.  I will continue to see him on 7/11/2025.  Please put that packet of information in the mail to him so he can start to look at that prior to the appointment.  Thanks.

## 2025-07-11 ENCOUNTER — OFFICE VISIT (OUTPATIENT)
Dept: UROLOGY | Age: 65
End: 2025-07-11
Payer: COMMERCIAL

## 2025-07-11 VITALS — BODY MASS INDEX: 31.41 KG/M2 | WEIGHT: 237 LBS | HEIGHT: 73 IN

## 2025-07-11 DIAGNOSIS — R97.20 ELEVATED PROSTATE SPECIFIC ANTIGEN (PSA): Primary | ICD-10-CM

## 2025-07-11 DIAGNOSIS — C61 PROSTATE CANCER: ICD-10-CM

## 2025-07-11 LAB
BILIRUB BLD-MCNC: NEGATIVE MG/DL
CLARITY, POC: CLEAR
COLOR UR: YELLOW
EXPIRATION DATE: ABNORMAL
GLUCOSE UR STRIP-MCNC: NEGATIVE MG/DL
KETONES UR QL: NEGATIVE
LEUKOCYTE EST, POC: NEGATIVE
Lab: ABNORMAL
NITRITE UR-MCNC: NEGATIVE MG/ML
PH UR: 5.5 [PH] (ref 5–8)
PROT UR STRIP-MCNC: NEGATIVE MG/DL
RBC # UR STRIP: ABNORMAL /UL
SP GR UR: 1.03 (ref 1–1.03)
UROBILINOGEN UR QL: ABNORMAL

## 2025-07-11 NOTE — PROGRESS NOTES
"Chief Complaint  Elevated PSA    Subjective            Davi Garcia presents to [facility] UROLOGY    History of Present Illness  The patient presents for prostate cancer.    He reports no complications following his previous visit. He has been monitoring his MyChart and is aware of his low-grade prostate cancer diagnosis. He expresses interest in understanding the likelihood of disease progression and is considering active surveillance as a management strategy. He also mentions that he has been undergoing regular PSA checks every six months with Dr. Kelvin Willis.     Oncology/Hematology History   Prostate cancer   6/26/2025 Cancer Staged    Staging form: Prostate, AJCC 8th Edition  - Clinical stage from 6/26/2025: Stage I (cT1c, cN0, cM0, PSA: 6.4, Grade Group: 1) - Signed by Svetlana Cooper MD on 7/11/2025 7/11/2025 Initial Diagnosis    Prostate cancer         Objective   Vital Signs:   Ht 185.4 cm (73\")   Wt 108 kg (237 lb)   BMI 31.27 kg/m²       Physical Exam  Vitals and nursing note reviewed.   Constitutional:       Appearance: Normal appearance. He is well-developed.   Pulmonary:      Effort: Pulmonary effort is normal.      Breath sounds: Normal air entry.   Neurological:      Mental Status: He is alert and oriented to person, place, and time.      Motor: Motor function is intact.   Psychiatric:         Mood and Affect: Mood normal.         Behavior: Behavior normal.          Result Review :   The following data was reviewed by: Svetlana Cooper MD on 07/11/2025:    Results for orders placed or performed in visit on 07/11/25   POC Urinalysis Dipstick, Automated    Collection Time: 07/11/25 11:26 AM    Specimen: Urine   Result Value Ref Range    Color Yellow Yellow, Straw, Dark Yellow, [place]    Clarity, UA Clear Clear    Specific Gravity  1.030 1.005 - 1.030    pH, Urine 5.5 5.0 - 8.0    Leukocytes Negative Negative    Nitrite, UA Negative Negative    Protein, POC Negative " Negative mg/dL    Glucose, UA Negative Negative mg/dL    Ketones, UA Negative Negative    Urobilinogen, UA 0.2 E.U./dL Normal, 0.2 E.U./dL    Bilirubin Negative Negative    Blood, UA Trace (A) Negative    Lot Number [id number]     Expiration Date 62,026      PSA          11/12/2024    12:04 2/21/2025    06:47   PSA   PSA 6.780  6.350        Results  Diagnostic Testing   - Prostate biopsy: Prostate cancer cells found, [place] grade 3+3=6.        Case ReportSurgical Pathology Report                         Case: [id number]                                Authorizing Provider:  Svetlana Cooper MD      Collected:           06/26/2025 08:47 AM        Ordering Location:     [facility] MAIN Received:            06/26/2025 10:43 AM                               OR                                                                        Pathologist:           Sondra Collier DO                                                    Specimens:   1) - Prostate, RIGHT PROSTATE X6                                                                 2) - Prostate, LEFT PROSTATE X5                                                                   3) - Prostate, REGION OF INTEREST 1 X3                                                           4) - Prostate, REGION OF INTEREST 2 X3                                              Clinical Information  Abnormal MRI  Elevated PSA  Final Diagnosis  1. Prostate gland, right, needle core biopsy:               - Adenocarcinoma, Grade Group 1 ([place] grade 3+3 = score of 6), in 3 of 6 cores, involving 6% of needle core tissue, and measuring 2 mm in length         2. Prostate gland, left , needle core biopsy:               - Adenocarcinoma, Grade Group 1 ([place] grade 3+3 = score of 6), in 4 of 5 cores, involving 22% of needle core tissue, and measuring 9 mm in length         3. Prostate gland, region of interest #1, needle core biopsy:               - Adenocarcinoma, Grade Group 1  "([place] grade 3+3 = score of 6), in 1 of 3 cores, involving 29% of needle core tissue, and measuring 7 mm in length         4. Prostate gland, region of interest #2, needle core biopsy:               - Benign prostatic tissue     REMARKS: The above positive (malignant) diagnosis was called to Pavithra RAMIREZ in Dr. CLINTON Cooper's office at 13:40 EDT on 6/27/2025 by Hernando BROWN        Electronically signed by Sondra Collier DO on 6/27/2025 at 1344 EDT  Gross Description  1. Prostate.  Received in formalin labeled \"right prostate x 6\" and consists of 6 pink-tan cylindrical cores of soft tissue ranging from 1.3 up to 2.1 cm in length, all with diameters averaging 0.1 cm.  The specimens are submitted entirely in cassettes 1A-1C.     2. Prostate.  In formalin labeled \"left prostate x 5\" and consists of 5 pink-tan cylindrical cores of soft tissue ranging from 0.4 up to 1.8 cm in length, all with diameters averaging 0.1 cm.  The specimen is submitted entirely in cassettes 2A-2C     3. Prostate.  Received in formalin labeled \"region of interest 1 x 3\" consists of 3 pink-tan cylindrical cores of soft tissue that range in length from 1.4 up to 1.8 cm, all with diameters averaging 0.1 cm.  The specimens are submitted entirely in cassettes 3A-3B.     4. Prostate.  Received in formalin labeled \"region of interest 2 x 3\" consists of 3 pink-tan cylindrical cores of soft tissue ranging from 1.2 up to 1.6 cm in length, with diameters averaging 0.1 cm.  The specimens are submitted entirely in cassettes 4A-4B.     PF     Microscopic Description  Microscopic examination performed.  [facility]          Assessment and Plan    Diagnoses and all orders for this visit:    1. Elevated prostate specific antigen (PSA) (Primary)  -     POC Urinalysis Dipstick, Automated  -     PSA DIAGNOSTIC; Future    2. Prostate cancer  -     PSA DIAGNOSTIC; Future      Assessment & Plan  1. Prostate cancer.  The patient's prostate cancer is low-grade, " with a [place] score of 3+3=6. The likelihood of the cancer spreading outside the prostate is minimal. Approximately half of the biopsies were positive, indicating a slightly higher volume, but all were low-grade. The probability of disease progression is estimated to be around 50%. Active surveillance was recommended as the most suitable management strategy at this time. The potential side effects of surgery and radiation, including incontinence and erectile dysfunction, were discussed. Surgery tends to cause immediate side effects that may improve over time, while radiation side effects may worsen over time. CyberKnife treatment was also mentioned as an option, although long-term data on its efficacy and side effects are limited. A referral to a radiation oncologist was offered for further discussion if desired. PSA levels will be monitored every 6 months. If there is a significant increase in PSA levels, further investigation or intervention may be necessary. An MRI will be repeated in 1 year to assess any changes in the region of interest. Even if the MRI results remain unchanged, a repeat biopsy will be performed to confirm the grade of the cancer.    Follow-up  A follow-up appointment is scheduled for 6 months from now.          Follow Up       No follow-ups on file.  Patient was given instructions and counseling regarding his condition or for health maintenance advice. Please see specific information pulled into the AVS if appropriate.     Transcribed from ambient dictation for Svetlana Cooper MD by Svetlana Cooper MD.  07/11/25   12:13 EDT    Patient or patient representative verbalized consent for the use of Ambient Listening during the visit with  Svetlana Cooper MD for chart documentation. 7/11/2025  12:46 EDT

## 2025-08-08 ENCOUNTER — ANESTHESIA EVENT (OUTPATIENT)
Dept: GASTROENTEROLOGY | Facility: HOSPITAL | Age: 65
End: 2025-08-08
Payer: COMMERCIAL

## 2025-08-11 ENCOUNTER — ANESTHESIA (OUTPATIENT)
Dept: GASTROENTEROLOGY | Facility: HOSPITAL | Age: 65
End: 2025-08-11
Payer: COMMERCIAL

## 2025-08-11 ENCOUNTER — HOSPITAL ENCOUNTER (OUTPATIENT)
Facility: HOSPITAL | Age: 65
Setting detail: HOSPITAL OUTPATIENT SURGERY
Discharge: HOME | End: 2025-08-11
Attending: INTERNAL MEDICINE | Admitting: INTERNAL MEDICINE
Payer: COMMERCIAL

## 2025-08-11 VITALS
OXYGEN SATURATION: 96 % | BODY MASS INDEX: 30.4 KG/M2 | DIASTOLIC BLOOD PRESSURE: 66 MMHG | RESPIRATION RATE: 12 BRPM | WEIGHT: 230.38 LBS | TEMPERATURE: 98.7 F | SYSTOLIC BLOOD PRESSURE: 109 MMHG | HEART RATE: 50 BPM

## 2025-08-11 DIAGNOSIS — Z86.0100 HISTORY OF COLONIC POLYPS: ICD-10-CM

## 2025-08-11 DIAGNOSIS — Z86.0100 HISTORY OF COLON POLYPS: ICD-10-CM

## 2025-08-11 DIAGNOSIS — Z12.11 SCREENING FOR MALIGNANT NEOPLASM OF COLON: ICD-10-CM

## 2025-08-11 PROCEDURE — 25010000002 PROPOFOL 10 MG/ML EMULSION: Performed by: NURSE ANESTHETIST, CERTIFIED REGISTERED

## 2025-08-11 PROCEDURE — 25010000002 LIDOCAINE PF 2% 2 % SOLUTION: Performed by: NURSE ANESTHETIST, CERTIFIED REGISTERED

## 2025-08-11 PROCEDURE — 45380 COLONOSCOPY AND BIOPSY: CPT | Performed by: INTERNAL MEDICINE

## 2025-08-11 PROCEDURE — 25810000003 LACTATED RINGERS PER 1000 ML: Performed by: NURSE ANESTHETIST, CERTIFIED REGISTERED

## 2025-08-11 PROCEDURE — 45385 COLONOSCOPY W/LESION REMOVAL: CPT | Performed by: INTERNAL MEDICINE

## 2025-08-11 PROCEDURE — 88305 TISSUE EXAM BY PATHOLOGIST: CPT | Performed by: INTERNAL MEDICINE

## 2025-08-11 RX ORDER — SODIUM CHLORIDE, SODIUM LACTATE, POTASSIUM CHLORIDE, CALCIUM CHLORIDE 600; 310; 30; 20 MG/100ML; MG/100ML; MG/100ML; MG/100ML
30 INJECTION, SOLUTION INTRAVENOUS CONTINUOUS
Status: DISCONTINUED | OUTPATIENT
Start: 2025-08-11 | End: 2025-08-11 | Stop reason: HOSPADM

## 2025-08-11 RX ORDER — SODIUM CHLORIDE 0.9 % (FLUSH) 0.9 %
3 SYRINGE (ML) INJECTION EVERY 12 HOURS SCHEDULED
Status: DISCONTINUED | OUTPATIENT
Start: 2025-08-11 | End: 2025-08-11 | Stop reason: HOSPADM

## 2025-08-11 RX ORDER — SODIUM CHLORIDE 9 MG/ML
40 INJECTION, SOLUTION INTRAVENOUS AS NEEDED
Status: DISCONTINUED | OUTPATIENT
Start: 2025-08-11 | End: 2025-08-11 | Stop reason: HOSPADM

## 2025-08-11 RX ORDER — PROPOFOL 10 MG/ML
VIAL (ML) INTRAVENOUS AS NEEDED
Status: DISCONTINUED | OUTPATIENT
Start: 2025-08-11 | End: 2025-08-11 | Stop reason: SURG

## 2025-08-11 RX ORDER — SODIUM CHLORIDE 0.9 % (FLUSH) 0.9 %
10 SYRINGE (ML) INJECTION AS NEEDED
Status: DISCONTINUED | OUTPATIENT
Start: 2025-08-11 | End: 2025-08-11 | Stop reason: HOSPADM

## 2025-08-11 RX ORDER — LIDOCAINE HYDROCHLORIDE 20 MG/ML
INJECTION, SOLUTION EPIDURAL; INFILTRATION; INTRACAUDAL; PERINEURAL AS NEEDED
Status: DISCONTINUED | OUTPATIENT
Start: 2025-08-11 | End: 2025-08-11 | Stop reason: SURG

## 2025-08-11 RX ADMIN — PROPOFOL 80 MG: 10 INJECTION, EMULSION INTRAVENOUS at 07:27

## 2025-08-11 RX ADMIN — LIDOCAINE HYDROCHLORIDE 60 MG: 20 INJECTION, SOLUTION EPIDURAL; INFILTRATION; INTRACAUDAL; PERINEURAL at 07:27

## 2025-08-11 RX ADMIN — LIDOCAINE HYDROCHLORIDE 40 MG: 20 INJECTION, SOLUTION EPIDURAL; INFILTRATION; INTRACAUDAL; PERINEURAL at 07:28

## 2025-08-11 RX ADMIN — SODIUM CHLORIDE, POTASSIUM CHLORIDE, SODIUM LACTATE AND CALCIUM CHLORIDE 30 ML/HR: 600; 310; 30; 20 INJECTION, SOLUTION INTRAVENOUS at 06:43

## 2025-08-11 RX ADMIN — PROPOFOL 200 MCG/KG/MIN: 10 INJECTION, EMULSION INTRAVENOUS at 07:28

## 2025-08-12 ENCOUNTER — RESULTS FOLLOW-UP (OUTPATIENT)
Dept: GASTROENTEROLOGY | Facility: HOSPITAL | Age: 65
End: 2025-08-12
Payer: COMMERCIAL

## 2025-08-12 LAB
CYTO UR: NORMAL
LAB AP CASE REPORT: NORMAL
LAB AP CLINICAL INFORMATION: NORMAL
PATH REPORT.FINAL DX SPEC: NORMAL
PATH REPORT.GROSS SPEC: NORMAL

## (undated) DEVICE — CVR PROB ULTRASND DISP LF

## (undated) DEVICE — MAX-CORE® DISPOSABLE CORE BIOPSY INSTRUMENT, 18G X 25CM: Brand: MAX-CORE

## (undated) DEVICE — Device: Brand: DEFENDO AIR/WATER/SUCTION AND BIOPSY VALVE

## (undated) DEVICE — GLOVE,SURG,SENSICARE SLT,LF,PF,6.5: Brand: MEDLINE

## (undated) DEVICE — COLON KIT: Brand: MEDLINE INDUSTRIES, INC.

## (undated) DEVICE — GUIDE 1/NDL BIOP ULTRASND DISP

## (undated) DEVICE — HDLR PROB ULTRASND URONAV DISP

## (undated) DEVICE — SOL IRRG H2O PL/BG 1000ML STRL

## (undated) DEVICE — SNAR E/S POLYP SNAREMASTER OVL/10MM 2.8X2300MM YEL

## (undated) DEVICE — MARKER,SKIN,WI/RULER AND LABELS: Brand: MEDLINE

## (undated) DEVICE — CONN JET HYDRA H20 AUXILIARY DISP

## (undated) DEVICE — DEFENDO AIR WATER SUCTION AND BIOPSY VALVE KIT: Brand: DEFENDO AIR/WATER/SUCTION AND BIOPSY VALVE

## (undated) DEVICE — Device

## (undated) DEVICE — SHEET,DRAPE,70X85,STERILE: Brand: MEDLINE

## (undated) DEVICE — DRSNG TELFA PAD NONADH STR 1S 3X4IN

## (undated) DEVICE — SNAR POLYP CAPTIFLEX XS/OVL 11X2.4MM 240CM 1P/U

## (undated) DEVICE — THE SINGLE USE ETRAP – POLYP TRAP IS USED FOR SUCTION RETRIEVAL OF ENDOSCOPICALLY REMOVED POLYPS.: Brand: ETRAP

## (undated) DEVICE — LINER SURG CANSTR SXN S/RIGD 1500CC

## (undated) DEVICE — SINGLE-USE BIOPSY FORCEPS: Brand: RADIAL JAW 4

## (undated) DEVICE — THE STERILE LIGHT HANDLE COVER IS USED WITH STERIS SURGICAL LIGHTING AND VISUALIZATION SYSTEMS.

## (undated) DEVICE — SOLIDIFIER LIQLOC PLS 1500CC BT

## (undated) DEVICE — TOWEL,OR,DSP,ST,BLUE,STD,4/PK,20PK/CS: Brand: MEDLINE

## (undated) DEVICE — SOL IRR NACL 0.9PCT BT 1000ML